# Patient Record
Sex: MALE | Race: BLACK OR AFRICAN AMERICAN | NOT HISPANIC OR LATINO | Employment: OTHER | URBAN - METROPOLITAN AREA
[De-identification: names, ages, dates, MRNs, and addresses within clinical notes are randomized per-mention and may not be internally consistent; named-entity substitution may affect disease eponyms.]

---

## 2018-06-25 ENCOUNTER — OFFICE VISIT (OUTPATIENT)
Dept: RHEUMATOLOGY | Facility: CLINIC | Age: 58
End: 2018-06-25
Payer: COMMERCIAL

## 2018-06-25 ENCOUNTER — HOSPITAL ENCOUNTER (OUTPATIENT)
Dept: RADIOLOGY | Facility: HOSPITAL | Age: 58
Discharge: HOME | End: 2018-06-25
Attending: INTERNAL MEDICINE
Payer: COMMERCIAL

## 2018-06-25 VITALS
BODY MASS INDEX: 26.25 KG/M2 | SYSTOLIC BLOOD PRESSURE: 159 MMHG | WEIGHT: 177.25 LBS | HEIGHT: 69 IN | HEART RATE: 73 BPM | DIASTOLIC BLOOD PRESSURE: 95 MMHG

## 2018-06-25 DIAGNOSIS — M25.511 CHRONIC PAIN OF BOTH SHOULDERS: Primary | ICD-10-CM

## 2018-06-25 DIAGNOSIS — G89.29 CHRONIC PAIN OF BOTH SHOULDERS: Primary | ICD-10-CM

## 2018-06-25 DIAGNOSIS — M54.41 CHRONIC MIDLINE LOW BACK PAIN WITH RIGHT-SIDED SCIATICA: ICD-10-CM

## 2018-06-25 DIAGNOSIS — G89.29 CHRONIC MIDLINE LOW BACK PAIN WITH RIGHT-SIDED SCIATICA: ICD-10-CM

## 2018-06-25 DIAGNOSIS — M25.511 CHRONIC PAIN OF BOTH SHOULDERS: ICD-10-CM

## 2018-06-25 DIAGNOSIS — G89.29 CHRONIC PAIN OF BOTH SHOULDERS: ICD-10-CM

## 2018-06-25 DIAGNOSIS — M25.512 CHRONIC PAIN OF BOTH SHOULDERS: ICD-10-CM

## 2018-06-25 DIAGNOSIS — R20.2 PARESTHESIA OF BOTH HANDS: ICD-10-CM

## 2018-06-25 DIAGNOSIS — M25.512 CHRONIC PAIN OF BOTH SHOULDERS: Primary | ICD-10-CM

## 2018-06-25 PROCEDURE — 73030 X-RAY EXAM OF SHOULDER: CPT | Mod: TC,50,FY,PO

## 2018-06-25 PROCEDURE — 72040 X-RAY EXAM NECK SPINE 2-3 VW: CPT | Mod: TC,FY,PO

## 2018-06-25 PROCEDURE — 99204 OFFICE O/P NEW MOD 45 MIN: CPT | Mod: S$GLB,,, | Performed by: INTERNAL MEDICINE

## 2018-06-25 PROCEDURE — 73030 X-RAY EXAM OF SHOULDER: CPT | Mod: 26,RT,, | Performed by: RADIOLOGY

## 2018-06-25 PROCEDURE — 72040 X-RAY EXAM NECK SPINE 2-3 VW: CPT | Mod: 26,,, | Performed by: RADIOLOGY

## 2018-06-25 PROCEDURE — 99999 PR PBB SHADOW E&M-NEW PATIENT-LVL III: CPT | Mod: PBBFAC,,, | Performed by: INTERNAL MEDICINE

## 2018-06-25 PROCEDURE — 3008F BODY MASS INDEX DOCD: CPT | Mod: CPTII,S$GLB,, | Performed by: INTERNAL MEDICINE

## 2018-06-25 PROCEDURE — 73030 X-RAY EXAM OF SHOULDER: CPT | Mod: 26,LT,, | Performed by: RADIOLOGY

## 2018-06-25 RX ORDER — LISINOPRIL AND HYDROCHLOROTHIAZIDE 10; 12.5 MG/1; MG/1
1 TABLET ORAL DAILY
COMMUNITY

## 2018-06-25 RX ORDER — NAPROXEN 500 MG/1
500 TABLET ORAL 2 TIMES DAILY
Qty: 60 TABLET | Refills: 0 | Status: SHIPPED | OUTPATIENT
Start: 2018-06-25 | End: 2018-08-14 | Stop reason: SDUPTHER

## 2018-06-25 RX ORDER — HYDROXYZINE HYDROCHLORIDE 10 MG/1
25 TABLET, FILM COATED ORAL 3 TIMES DAILY PRN
COMMUNITY

## 2018-06-25 RX ORDER — MULTIVITAMIN
1 TABLET ORAL DAILY
COMMUNITY

## 2018-06-25 NOTE — PROGRESS NOTES
CC:No chief complaint on file.      History of Present Illness:  Roberto Flores a 57 y.o.yo male presents for new patient evaluation.  He complains of pain in both shoulders, low back    Shoulder pain:  Chronic, right greater than left   Pain aggravated with range of motion.  It  hurts at rest as well  It radiates down the upper arms.   With intermittent  he notes tingling in both the hands.  It resolves spontaneously  No shoulder swelling noted.  No hand weakness  In 2016 he had a neck surgery in [place] .  Cervical disc surgery from what he says  He noted  the shoulder pain 6 months after surgery.  He followed up with the surgeon   No abnormalities noted and he was given Mobic  It helped for a while.   Now he takes Tylenol Extra Strength as needed with some relief.  He was on gabapentin in the past but stopped as it did not help much    Chronic low back pain:  He has noted worsening in the last 8-9 months.  Sometimes radiates down the right lower extremity.  No tingling numbness.  No incontinence noted  He had x-rays done by PCP and was told nothing significant  But the pain persisted and continued to worsen.    No joint swelling noted  He sometimes hurts in his knees.  This can be seen after periods  of prolonged resting or activity  He is a  not sure if she can do PT now    He is having a cough on lisinopril and stopped , due to f/u PCP   No rashes, fever, chills, sicca syndrome          Past Medical History:   Diagnosis Date    Hypertension 8/2016       Past Surgical History:   Procedure Laterality Date    CERVICAL DISC SURGERY           Social History   Substance Use Topics    Smoking status: Never Smoker    Smokeless tobacco: Not on file    Alcohol use Yes      Comment: occassionaly       History reviewed. No pertinent family history.     None for CTD     Review of patient's allergies indicates:  No Known Allergies          Review of Systems:  Constitutional: Denies fever, chills. No  recent weight changes.   Fatigue: no  Muscle weakness: no  Headaches: no new headaches  Eyes: No redness or dryness.  No recent visual changes.  ENT: Denies dry mouth. No oral or nasal ulcers.  Card: No chest pain.  Resp: No cough or sob.   Gastro: No nausea or vomiting.  No heartburn.  Constipation: no  Diarrhea: no  Genito:  No dysuria.  No genital ulcers.  Skin: No rash.  Raynauds:no  Neuro:+ hands intermittently   Psych: No depression, anxiety  Endo:  no excess thirst.  Heme: no abnormal bleeding or bruising  Clots:none   Miscarriages :     OBJECTIVE:     Vital Signs   Vitals:    06/25/18 0817   BP: (!) 159/95   Pulse: 73     Physical Exam:  General Appearance:  NAD.   Gait: not favoring.  HEENT: PERRL.  Eyes not dry or injected.  No nasal ulcers.  Mouth not dry, no oral lesions.  Lymph: cervical, supraclavicular or axillary nodes: none abnormal   Cardio: no irregularity of S1 or S2.  No gallops or rubs.   Resp: Normal respiratory motion. Clear to auscultation bilaterally.   No abnormal chest conformation.  Abd: Soft, non-tender, nondistended.  No masses.   Skin: Head and neck,  and extremities examined. No rashes.   Neuro: Ox3.   Cranial nerves II-XII grossly intact.   Sensation intact  in both distal LE and upper extremities to light touch.  Musculoskeletal Exam:    Right Side Rheumatological Exam     Examination finds the shoulder: pain on overhead abduction but normal ROM   elbow, wrist, , 1st PIP, 1st MCP, 2nd PIP, 2nd MCP, 3rd PIP, 3rd MCP, 4th PIP, 4th MCP, 5th PIP and 5th MCP no synovitis   Knee : crepitus   Others :  Hip: No synovitis   Ankle :No synovitis   Foot:No synovitis     Left Side Rheumatological Exam     Examination finds the shoulder: pain on overhead abduction but normal ROM    elbow, wrist, 1st PIP, 1st MCP, 2nd PIP, 2nd MCP, 3rd PIP, 3rd MCP, 4th PIP, 4th MCP, 5th PIP and 5th MCP no synovitis   Knee : crepitus   Others :  Hip:No synovitis   Ankle :No synovitis   Foot:No synovitis     Spine  :  Occiput to wall :neg   Modified schobers :neg     Tender points:  Muscle strength:Equal and full in all mm groups of the upper and lower ext.    Laboratory: No results found for this or any previous visit.  Imaging :    Notes reviewed  Other procedures:    ASSESSMENT/PLAN:     Chronic pain of both shoulders  -     Rheumatoid factor; Future; Expected date: 06/25/2018  -     Cyclic citrul peptide antibody, IgG; Future; Expected date: 06/25/2018  -     IMELDA; Standing  -     CK; Standing  -     Aldolase; Standing  -     Sedimentation rate; Standing  -     C-reactive protein; Standing  -     Comprehensive metabolic panel; Standing  -     CBC auto differential; Standing  -     X-Ray Cervical Spine AP And Lateral; Future; Expected date: 06/25/2018  -     X-Ray Shoulder 2 or more views Bilat; Future; Expected date: 06/25/2018  -     naproxen (NAPROSYN) 500 MG tablet; Take 1 tablet (500 mg total) by mouth 2 (two) times daily. With food  Dispense: 60 tablet; Refill: 0    Chronic midline low back pain with right-sided sciatica  -     MRI Lumbar Spine Without Contrast; Future; Expected date: 06/25/2018    Paresthesia of both hands  -     EMG W/ ULTRASOUND AND NERVE CONDUCTION TEST 2 Extremities; Future      1:b/l shoulder pain :  Possible arthritis plus rotator cuff tendinopathy  Check x-ray shoulder  Naprosyn 500 b.i.d.  PT/shoulder injections would be future considerations    2: b/l knee OA :  X ray knees   Naprosyn 500 b.i.d.    3:  Polyarthralgia involving shoulders, knees  Etiology seems mostly degenerative arthritis  Check labs as above to rule out any connective tissue    4:  Chronic low back pain:  Persistent, normal x-ray L-spine per him  Will check MRI L-spine    5:  History of cervical disc surgery:  Stable, check x-ray C-spine    6:  Tingling in both hands:  A possible neuropathy, check EMG/NCS      Risks vs Benefits and potential side effects of medication prescribed today were discussed with patient. Medication  literature given to patient up discharge  Went over uptodate information /literature on the meds prescribed today      4 week return for review

## 2018-06-25 NOTE — PROGRESS NOTES
CC:No chief complaint on file.      History of Present Illness:  Roberto Flores a 57 y.o.yo male   There is no problem list on file for this patient.        Past Medical History:   Diagnosis Date    Hypertension        Past Surgical History:   Procedure Laterality Date    CERVICAL DISC SURGERY           Social History   Substance Use Topics    Smoking status: Never Smoker    Smokeless tobacco: Not on file    Alcohol use Yes      Comment: occassionaly       History reviewed. No pertinent family history.    Review of patient's allergies indicates:  No Known Allergies          Review of Systems:  Constitutional: Denies fever, chills. No recent weight changes.   Fatigue: no  Muscle weakness: no  Headaches: no new headaches  Eyes: No redness or dryness.  No recent visual changes.  ENT: Denies dry mouth. No oral or nasal ulcers.  Card: No chest pain.  Resp: No cough or sob.   Gastro: No nausea or vomiting.  No heartburn.  Constipation: no  Diarrhea: no  Genito:  No dysuria.  No genital ulcers.  Skin: No rash.  Raynauds:no  Neuro: No numbness / tingling.   Psych: No depression, anxiety  Endo:  no excess thirst.  Heme: no abnormal bleeding or bruising  Clots:none   Miscarriages : none     OBJECTIVE:     Vital Signs   Vitals:    06/25/18 0817   BP: (!) 159/95   Pulse: 73     Physical Exam:  General Appearance:  NAD.   Gait: not favoring.  HEENT: PERRL.  Eyes not dry or injected.  No nasal ulcers.  Mouth not dry, no oral lesions.  Lymph: cervical, supraclavicular or axillary nodes: none abnormal   Cardio: no irregularity of S1 or S2.  No gallops or rubs.   Resp: Normal respiratory motion. Clear to auscultation bilaterally.   No abnormal chest conformation.  Abd: Soft, non-tender, nondistended.  No masses.   Skin: Head and neck,  and extremities examined. No rashes.   Neuro: Ox3.   Cranial nerves II-XII grossly intact.   Sensation intact  in both distal LE and upper extremities to light touch.  Musculoskeletal  Exam:    Right Side Rheumatological Exam     Examination finds the shoulder, elbow, wrist, knee, 1st PIP, 1st MCP, 2nd PIP, 2nd MCP, 3rd PIP, 3rd MCP, 4th PIP, 4th MCP, 5th PIP and 5th MCP no synovitis     Others :  Hip: No synovitis   Ankle :No synovitis   Foot:No synovitis     Left Side Rheumatological Exam     Examination finds the shoulder, elbow, wrist, knee, 1st PIP, 1st MCP, 2nd PIP, 2nd MCP, 3rd PIP, 3rd MCP, 4th PIP, 4th MCP, 5th PIP and 5th MCP no synovitis     Others :  Hip:No synovitis   Ankle :No synovitis   Foot:No synovitis     Spine :  Occiput to wall :  Modified schobers :    Tender points:  Muscle strength:Equal and full in all mm groups of the upper and lower ext.    Laboratory: No results found for this or any previous visit.  Imaging :    Notes reviewed  Other procedures:    ASSESSMENT/PLAN:     There are no diagnoses linked to this encounter.      Risks vs Benefits and potential side effects of medication prescribed today were discussed with patient. Medication literature given to patient up discharge  Went over uptodate information /literature on the meds prescribed today

## 2018-06-26 ENCOUNTER — TELEPHONE (OUTPATIENT)
Dept: RHEUMATOLOGY | Facility: CLINIC | Age: 58
End: 2018-06-26

## 2018-06-26 NOTE — TELEPHONE ENCOUNTER
----- Message from Zayra Goldstein sent at 6/26/2018 10:25 AM CDT -----  Contact: jaxon/gracia [phone]  States that she is returning call. Please call back at [phone]//thank you acc

## 2018-08-09 ENCOUNTER — TELEPHONE (OUTPATIENT)
Dept: RADIOLOGY | Facility: HOSPITAL | Age: 58
End: 2018-08-09

## 2018-08-10 ENCOUNTER — HOSPITAL ENCOUNTER (OUTPATIENT)
Dept: RADIOLOGY | Facility: HOSPITAL | Age: 58
Discharge: HOME | End: 2018-08-10
Attending: INTERNAL MEDICINE
Payer: COMMERCIAL

## 2018-08-10 DIAGNOSIS — G89.29 CHRONIC MIDLINE LOW BACK PAIN WITH RIGHT-SIDED SCIATICA: ICD-10-CM

## 2018-08-10 DIAGNOSIS — G89.29 CHRONIC PAIN OF BOTH SHOULDERS: ICD-10-CM

## 2018-08-10 DIAGNOSIS — M25.512 CHRONIC PAIN OF BOTH SHOULDERS: ICD-10-CM

## 2018-08-10 DIAGNOSIS — M25.511 CHRONIC PAIN OF BOTH SHOULDERS: ICD-10-CM

## 2018-08-10 DIAGNOSIS — M54.41 CHRONIC MIDLINE LOW BACK PAIN WITH RIGHT-SIDED SCIATICA: ICD-10-CM

## 2018-08-10 PROCEDURE — 72148 MRI LUMBAR SPINE W/O DYE: CPT | Mod: 26,,, | Performed by: RADIOLOGY

## 2018-08-10 PROCEDURE — 72148 MRI LUMBAR SPINE W/O DYE: CPT | Mod: TC,PO

## 2018-08-10 RX ORDER — NAPROXEN 500 MG/1
500 TABLET ORAL 2 TIMES DAILY
Qty: 60 TABLET | Refills: 0 | Status: CANCELLED | OUTPATIENT
Start: 2018-08-10

## 2018-08-10 NOTE — TELEPHONE ENCOUNTER
Spoke with Mrs. Sherman who states that Mr. Sherman needed a refill of his naproxen sent to [facility].Patient informed that Dr. Dominguez is out of the clinic and will not return until Monday August 13, 2018.

## 2018-08-10 NOTE — TELEPHONE ENCOUNTER
----- Message from Jagruti Braga sent at 8/10/2018  9:41 AM CDT -----  Contact: self [phone]  Would like to follow-up with nurse regarding status of refill request. Please call back at [phone].  Md Angi

## 2018-08-14 DIAGNOSIS — M25.512 CHRONIC PAIN OF BOTH SHOULDERS: ICD-10-CM

## 2018-08-14 DIAGNOSIS — M25.511 CHRONIC PAIN OF BOTH SHOULDERS: ICD-10-CM

## 2018-08-14 DIAGNOSIS — M48.061 SPINAL STENOSIS OF LUMBAR REGION, UNSPECIFIED WHETHER NEUROGENIC CLAUDICATION PRESENT: Primary | ICD-10-CM

## 2018-08-14 DIAGNOSIS — G89.29 CHRONIC PAIN OF BOTH SHOULDERS: ICD-10-CM

## 2018-08-14 NOTE — TELEPHONE ENCOUNTER
----- Message from Kalpesh Hancock sent at 8/14/2018  1:02 PM CDT -----  Be justinejosh ( Pt wife )is requesting a call from nurse to f/u on a prescription .          Please call Juan Diego fletcherjosh ( Pt wife )  [phone] ( cell) [phone] ext ( [phone] please ask for Lorie Sherman         .  [id number] - [place] [address] [address] [place] [address]  Phone: [phone] Fax: [phone]

## 2018-08-14 NOTE — TELEPHONE ENCOUNTER
----- Message from Lenka De La Cruz sent at 8/14/2018  3:03 PM CDT -----  Contact: Doris [phone] ext [phone]  Patient is calling in regards to his Naprox refills that was to be called in yesterday.

## 2018-08-15 ENCOUNTER — TELEPHONE (OUTPATIENT)
Dept: PHYSICAL MEDICINE AND REHAB | Facility: CLINIC | Age: 58
End: 2018-08-15

## 2018-08-15 ENCOUNTER — TELEPHONE (OUTPATIENT)
Dept: RHEUMATOLOGY | Facility: CLINIC | Age: 58
End: 2018-08-15

## 2018-08-15 RX ORDER — NAPROXEN 500 MG/1
500 TABLET ORAL 2 TIMES DAILY
Qty: 60 TABLET | Refills: 0 | Status: SHIPPED | OUTPATIENT
Start: 2018-08-15

## 2018-08-15 NOTE — TELEPHONE ENCOUNTER
Left a message for pt explaining that if we reschedule that appt , it will be in middle of September. Pt will call back to reschedule if needed, as of now appt is scheduled as was.

## 2018-08-15 NOTE — TELEPHONE ENCOUNTER
----- Message from Sharita Padilla sent at 8/15/2018 11:34 AM CDT -----  Contact: Patient   Patient would like a call back at [phone], Regards to rescheduling his appointment.    Thanks  Td

## 2018-08-15 NOTE — TELEPHONE ENCOUNTER
----- Message from Minerva Dominguez MD sent at 8/14/2018 10:57 PM CDT -----  Mri shows spinal canal narrowing , [facility] or [facility] in  [place]   Pt preference    referrel placed

## 2018-09-12 ENCOUNTER — TELEPHONE (OUTPATIENT)
Dept: PHYSICAL MEDICINE AND REHAB | Facility: CLINIC | Age: 58
End: 2018-09-12

## 2018-09-12 NOTE — TELEPHONE ENCOUNTER
----- Message from Emily King sent at 9/12/2018 10:09 AM CDT -----  Contact: Krystyna - [phone]  Would like to  Reschedule appt.  Please call back at [phone].  x-

## 2018-10-19 ENCOUNTER — OFFICE VISIT (OUTPATIENT)
Dept: PHYSICAL MEDICINE AND REHAB | Facility: CLINIC | Age: 58
End: 2018-10-19
Payer: COMMERCIAL

## 2018-10-19 VITALS
SYSTOLIC BLOOD PRESSURE: 164 MMHG | WEIGHT: 177 LBS | HEIGHT: 69 IN | DIASTOLIC BLOOD PRESSURE: 96 MMHG | HEART RATE: 68 BPM | RESPIRATION RATE: 12 BRPM | BODY MASS INDEX: 26.22 KG/M2

## 2018-10-19 DIAGNOSIS — M54.12 CHRONIC CERVICAL RADICULOPATHY: Primary | ICD-10-CM

## 2018-10-19 DIAGNOSIS — M54.12 ACUTE CERVICAL RADICULOPATHY: ICD-10-CM

## 2018-10-19 PROCEDURE — 99999 PR PBB SHADOW E&M-EST. PATIENT-LVL III: CPT | Mod: PBBFAC,,, | Performed by: PHYSICAL MEDICINE & REHABILITATION

## 2018-10-19 PROCEDURE — 3008F BODY MASS INDEX DOCD: CPT | Mod: CPTII,S$GLB,, | Performed by: PHYSICAL MEDICINE & REHABILITATION

## 2018-10-19 PROCEDURE — 95886 MUSC TEST DONE W/N TEST COMP: CPT | Mod: S$GLB,,, | Performed by: PHYSICAL MEDICINE & REHABILITATION

## 2018-10-19 PROCEDURE — 95911 NRV CNDJ TEST 9-10 STUDIES: CPT | Mod: S$GLB,,, | Performed by: PHYSICAL MEDICINE & REHABILITATION

## 2018-10-19 PROCEDURE — 99204 OFFICE O/P NEW MOD 45 MIN: CPT | Mod: 25,S$GLB,, | Performed by: PHYSICAL MEDICINE & REHABILITATION

## 2018-10-19 NOTE — PROGRESS NOTES
[facility] [address] [place] [address]  Phone: [phone]          Full Name: tristan correa YOB: 1960  Patient ID: [id number]      Visit Date: 10/19/2018 08:02  Age: 58 Years 2 Months Old  Examining Physician: Blanka Govea M.D.  Referring Physician:   Reason for Referral: ue numbness        Chief Complaint   Patient presents with    Numbness     hands/arms       HPI: This is a 58 y.o.  male being seen in clinic today for evaluation of chronic constant arm/hand numbness/tingling and achy pain at his shoulders radiating into his arms.  He has a history of cervical and lumbar spinal stenosis with subsequent cervical ACDF.  He still has additional leg weakness/stiffness with impaired gait.  He denies significant arm weakness, but has loss of  strength at times.  Nothing really provides significant relief.     History obtained from patient    Past family, medical, social, and surgical history reviewed in chart    Review of Systems:     General- denies lethargy, weight change, fever, chills  Head/neck- denies swallowing difficulties  ENT- denies hearing changes  Cardiovascular-denies chest pain  Pulmonary- denies shortness of breath  GI- denies constipation or bowel incontinence  - denies bladder incontinence  Skin- denies wounds or rashes  Musculoskeletal- +weakness, +pain  Neurologic- +numbness and tingling  Psychiatric- denies depressive or psychotic features, denies anxiety  Lymphatic-denies swelling  Endocrine- denies hypoglycemic symptoms/DM history  All other pertinent systems negative     Physical Examination:  General: Well developed, well nourished male, NAD  HEENT:NCAT EOMI bilaterally   Pulmonary:Normal respirations    Spinal Examination: CERVICAL  Active ROM is limited at endranges.  Inspection: No deformity of spinal alignment.  Palpation: No vertebral tenderness to percussion.    Ant surgical scar    Spinal Examination: LUMBAR or  THORACIC  Active ROM is limited at endranges  Inspection: No deformity of spinal alignment.      Musculoskeletal Tests:  Phalen: neg  Elbow compression (ulnar): neg  Tinels at wrist: neg    Bilateral Upper and Lower Extremities:  Pulses are 2+ at radial bilaterally.  Shoulder/Elbow/Wrist/Hand ROM wnl  Hip/Knee/Ankle ROM   Bilateral Extremities show normal capillary refill.  No signs of cyanosis, rubor, edema, skin changes, or dysvascular changes of appendages.  Nails appear intact.    Neurological Exam:  Cranial Nerves:  II-XII grossly intact    Manual Muscle Testing: (Motor 5=normal)  5/5 strength bilateral upper extremities    No focal atrophy is noted of either upper  extremity.    Bilateral Reflexes hypo at bic tric br  Bowser's response is absent bilaterally.    Sensation: tested to light touch  - intact in arms     Gait: limited hip and knee ROM, stiff gait-worse on right      Entire procedure explained to patient prior to proceeding.  Verbal consent obtained        SNC      Nerve / Sites Rec. Site Onset Lat Peak Lat Amp Segments Distance Velocity     ms ms µV  mm m/s   R Median - Digit II (Antidromic)      Wrist Dig II 3.6 5.1 23.9 Wrist - Dig  38   L Median - Digit II (Antidromic)      Wrist Dig II 3.6 5.5 36.5 Wrist - Dig  39   R Ulnar - Digit V (Antidromic)      Wrist Dig V 3.7 4.9 9.7 Wrist - Dig V 140 38   L Ulnar - Digit V (Antidromic)      Wrist Dig V 3.4 4.6 12.5 Wrist - Dig V 140 41   R Radial - Anatomical snuff box (Forearm)      Forearm Wrist 2.0 2.8 10.9 Forearm - Wrist 100 49   L Radial - Anatomical snuff box (Forearm)      Forearm Wrist 2.2 2.8 16.9 Forearm - Wrist 100 46       MNC      Nerve / Sites Muscle Latency Amplitude Duration Rel Amp Segments Distance Lat Diff Velocity     ms mV ms %  mm ms m/s   R Median - APB      Wrist APB 5.0 13.2 7.3 100 Wrist - APB 80        Elbow APB 9.8 12.5 7.7 95 Elbow - Wrist 220 4.8 46   L Median - APB      Wrist APB 5.3 10.5 8.0 100 Wrist - APB  80        Elbow APB 10.8 8.2 8.1 77.9 Elbow - Wrist 260 5.6 47   R Ulnar - ADM      Wrist ADM 3.8 7.4 7.7 100 Wrist - ADM 80        B.Elbow ADM 8.4 6.2 7.7 83.5 B.Elbow - Wrist 250 4.6 54      A.Elbow ADM 10.3 6.7 8.8 109 A.Elbow - B.Elbow 100 1.9 52         A.Elbow - Wrist  6.6    L Ulnar - ADM      Wrist ADM 3.8 11.4 6.9 100 Wrist - ADM 80        B.Elbow ADM 9.0 9.1 6.8 80 B.Elbow - Wrist 250 5.2 48      A.Elbow ADM 11.1 8.4 7.7 92.5 A.Elbow - B.Elbow 100 2.1 48         A.Elbow - Wrist  7.3        EMG            EMG Summary Table     Spontaneous MUAP Recruitment   Muscle IA Fib PSW Fasc Other Amp Dur Polys Pattern Effort   R. First dorsal interosseous N None None None . N n 1+ sl red Max   R. Abductor pollicis brevis N None None None . N n 2+ Reduced Max   R. Flexor carpi ulnaris N None None None . N n N sl red Max   R. Pronator teres Incr 1+ None None . N n 1+ sl red Max   R. Triceps brachii Incr None 1+ None . N Sl Incr 1+ sl red Max   R. Biceps brachii N None None None . N n 1+ sl red Max   R. Deltoid N None None None . N n 1+ Reduced Max                                  INTERPRETATION  -Bilateral median motor nerve conduction study showed normal latency, amplitude, and conduction velocity  -Bilateral median sensory nerve conduction study showed normal peak latency and amplitude  -Bilateral ulnar motor nerve conduction study showed normal latency, amplitude, and conduction velocity  -Bilateral ulnar sensory nerve conduction study showed normal peak latency and amplitude  -Bilateral radial sensory nerve conduction study showed normal peak latency and amplitude  -Needle EMG examination performed to above mentioned muscles     IMPRESSION  1. ABNORMAL study  2. There is electrodiagnostic evidence of an ACUTE on CHRONIC radiculopathy of the C7 nerve root and a CHRONIC radiculopathy of the C5-T1 nerve roots    PLAN  1. Follow up with referring provider: Dr. Minerva Dominguez  2. Handouts on Cspine issues,  radiculopathy, stretching provided. Rec referral to PT for strengthening, gait, traction, ROM. Rec referral to IPM for further evaluation and treatment (possible ESIs)  3. This study is good for one year. If symptoms worsen or do not improve, please re-consult.    Blanka Govea M.D.  Physical Medicine and Rehab

## 2018-10-19 NOTE — LETTER
October 19, 2018      Minerva Dominguez MD  [address] [place] [address] [address] [place] [address]           [facility]iat[facility]  [address] [place] [address]  Phone: [phone]  Fax: [phone]          Patient: Roberto Sherman   MR Number: [id number]   YOB: 1960   Date of Visit: 10/19/2018       Dear Dr. Minerva Dominguez:    Thank you for referring Roberto Sherman to me for evaluation. Attached you will find relevant portions of my assessment and plan of care.    If you have questions, please do not hesitate to call me. I look forward to following Roberto Sherman along with you.    Sincerely,    Blanka Govea MD    Enclosure  CC:  No Recipients    If you would like to receive this communication electronically, please contact [email or web address] or [phone] to request more information on [employer] Link access.    For providers and/or their staff who would like to refer a patient to [facility], please contact us through our one-stop-shop provider referral line, [facility], at [phone].    If you feel you have received this communication in error or would no longer like to receive these types of communications, please e-mail [email or web address]

## 2018-10-19 NOTE — PATIENT INSTRUCTIONS
Understanding Cervical Radiculopathy    Cervical radiculopathy is irritation or inflammation of a nerve root in the neck. It causes neck pain and other symptoms that may spread into the chest or down the arm. To understand this condition, it helps to understand the parts of the spine:  · Vertebrae. These are bones that stack to form the spine. The cervical spine contains the 7 vertebrae in the neck.  · Disks. These are soft pads of tissue between the vertebrae. They act as shock absorbers for the spine.  · The spinal canal. This is a tunnel formed within the stacked vertebrae. The spinal cord runs through this canal.  · Nerves. These branch off the spinal cord. As they leave the spinal canal, nerves pass through openings between the vertebrae. The nerve root is the part of the nerve that is closest to the spinal cord.   With cervical radiculopathy, nerve roots in the neck become irritated. This leads to pain and symptoms that can travel to the nerves that go from the spinal cord down the arms and into the torso.  What causes cervical radiculopathy?  Aging, injury, poor posture, and other issues can lead to problems in the neck. These problems may then irritate nerve roots. These include:  · Damage to a disk in the cervical spine. The damaged disk may then press on nearby nerve roots.  · Degeneration from wear and tear, and aging. This can lead to narrowing (stenosis) of the openings between the vertebrae. The narrowed openings press on nerve roots as they leave the spinal canal.  · An unstable spine. This is when a vertebra slips forward. It can then press on a nerve root.  There are other, less common causes of pressure on nerves in the neck. These include infection, cysts, and tumors.  Symptoms of cervical radiculopathy  These include:  · Neck pain  · Pain, numbness, tingling, or weakness that travels down the arm  · Loss of neck movement  · Muscle spasms  Treatment for cervical radiculopathy  In most cases,  your healthcare provider will first try treatments that help relieve symptoms. These may include:  · Prescription or over-the-counter pain medicines. These help relieve pain and swelling.  · Cold packs. These help reduce pain.  · Resting. This involves avoiding positions and activities that increase pain.  · Neck brace (cervical collar). This can help relieve inflammation and pain.  · Physical therapy, including exercises and stretches. This can help decrease pain and increase movement and function.  · Shots of medicinesaround the nerve roots. This is done to help relieve symptoms for a time.  In some cases, your healthcare provider may advise surgery to fix the underlying problem. This depends on the cause, the symptoms, and how long the pain has lasted.  Possible complications  Over time, an irritated and inflamed nerve may become damaged. This may lead to long-lasting (permanent) numbness or weakness. If symptoms change suddenly or get worse, be sure to let your healthcare provider know.     When to call your healthcare provider  Call your healthcare provider right away if you have any of these:  · New pain or pain that gets worse  · New or increasing weakness, numbness, or tingling in your arm or hand  · Bowel or bladder changes   Date Last Reviewed: 3/10/2016  © 4893-8152 [employer]. [address] [place] [address], [place] [address]. All rights reserved. This information is not intended as a substitute for professional medical care. Always follow your healthcare professional's instructions.        Nonsurgical Treatment of Cervical Spine Problems  Cervical spine problems can often be treated without surgery. Choices include rest, medicines, or injections. Your healthcare provider may also suggest physical therapy or certain exercises. These may all help to relieve your symptoms. These treatments are often successful. But if your symptoms dont subside, talk to your healthcare provider. He or she may tell  you that surgery is your best choice.  Relieving your symptoms  Your healthcare provider may recommend:  · Medicines. These help to reduce pain and inflammation.  · Epidural steroid injections. These are injections into the spinal canal near the spinal cord. They may relieve severe pain and reduce inflammation.  · Restricting aggravating activities. This can help to give your cervical spine time to heal.  · A soft cervical collar. This can help to support your head while keeping your cervical spine aligned.  · Traction. This may help relieve pressure on the irritated nerves.  Restoring mobility and strength  Your healthcare provider may recommend that you work with a physical therapist. This can help you regain mobility and strength in your neck. Physical therapy may last for 4 to 8 weeks. It may include:  · Exercises to improve your necks range of motion and strength.  · Evaluation and correction of posture and body movements. This can correct problems that affect your cervical spine.  · Heat, massage, and traction to help relieve your symptoms.  Self-care  Youll take an active role in your own therapy. To protect your neck from further injury:  · Follow any exercise program given to you by your healthcare provider or physical therapist.  · Practice good posture while sitting, standing, or moving.  · Have your workspace evaluated. Rearrange it if needed.  · When lying down, support your neck. You can use a special cervical pillow or rolled-up towel.   Date Last Reviewed: 10/5/2015  © 7203-6996 [employer]. [address] [place] [address]. All rights reserved. This information is not intended as a substitute for professional medical care. Always follow your healthcare professional's instructions.        Understanding Neck Problems       If you suffer from neck pain, youre not alone. Many people have neck pain at some point in their lives. Problems such as poor posture, injury, and wear and  tear can lead to neck pain. Your healthcare provider will work with you to find the treatment thats best for your neck.  Types of neck problems    The following problems can cause pain or injury in your neck:  · Strains and sprains: Strains (stretched or torn muscles) and sprains (stretched or torn ligaments) can cause neck pain. Strains and sprains can occur during an accident, or when you overuse your neck through repetitive motion. They can also cause your muscles and ligaments to become inflamed (swollen and painful).  · Whiplash and other injuries: Whiplash can result when an impact throws your head, forcing your neck too far forward, then too far backward. When combined, the two motions can cause a painful injury to different parts of your neck, such as muscles, ligaments, or joints. The most common cause of whiplash is a car accident. But it can also happen during a fall or sports injury.  · Weakened disks: A simple action, such as a sneeze or a cough, can cause one of your disks to bulge or rupture (herniate). A herniated disk can put pressure on your nerve and cause pain. Over time, disks can also thin out (degenerate). Flattened disks dont cushion vertebrae well and can cause vertebrae to rub together. Also, there is less space for the nerves. This can pinch nerves and cause pain.  · Weakened joints: Aging and injury can cause joints to slowly degenerate. Thinned joints can also cause vertebrae to rub together. This can cause abnormal growths of bone (bone spurs) to form on vertebrae. Bone spurs put pressure on nerves, causing pain.  Common symptoms  If you have a neck problem, you may have one or more of the following symptoms:  · Muscle tension and spasm: You may not be able to move your neck, arms, or shoulders comfortably if you have muscle tension or stiffness in your neck. If your symptoms arent relieved, you may experience muscle spasms, or knots of contracted tissue (trigger points) in areas of  your neck and shoulders.  · Aches and pains: Dull aches in your head or neck, sharp pains, and swelling of the soft tissue of your neck and shoulders are common symptoms. If theres pressure on the nerves in your neck, you may feel pain in your arms or hands.  · Numbness or weakness: If you injure the nerves in your neck, you may have numbness, tingling, or weakness in your shoulders, arms, or hands. These symptoms arise when disks or bone spurs press on the nerves in your neck. Severe disease can also affect your legs.  Date Last Reviewed: 8/23/2015  © 2241-2016 [employer]. [address] [place] [address], [place] [address]. All rights reserved. This information is not intended as a substitute for professional medical care. Always follow your healthcare professional's instructions.        Neck Problems: Relieving Your Symptoms  The first goal of treatment is to relieve your symptoms. Your healthcare provider may recommend self-care treatments. These include resting, applying ice and heat, taking medicine, and doing exercises. Your healthcare provider may also recommend that you see a physical therapist who can teach you ways to care for and strengthen your neck.     Heat relaxes sore muscles and helps relieve spasms.   Self-care treatments  Pain can end quickly or last awhile. Either way, youll want relief as soon as possible. Your healthcare provider can tell you which treatments to do at home to help relieve your pain.  · Lying down for a short time takes pressure from the head off the neck.  · Ice and heat can help reduce pain. To bring down swelling, rest an ice pack wrapped in a thin towel on your neck for 10 to 15 minutes. To relax sore muscles, apply a warm, wet towel to the area. Or you can take a warm bath or shower.  · Over-the-counter medicines, such as ibuprofen, naproxen, and aspirin, can help reduce pain and swelling. Acetaminophen can help relieve pain. Use these only as  directed.  · Exercises can relax muscles and ease stiffness. To prepare, drape a warm, wet towel around your neck and shoulders for 5 minutes. Remove the towel. Then do any exercises recommended to you by your healthcare provider.  Physical therapy  If self-care treatments arent helping relieve neck pain, your healthcare provider may suggest physical therapy. Physical therapy is done by a specialist trained to treat injuries. Your physical therapist (PT) will teach you how to strengthen muscles, improve the spines alignment, and help you move properly. Treatment methods used in physical therapy may include:  · Heat. A special heating pad called a neck pack may be applied to your neck.  · Exercises. Your PT will teach you exercises to help strengthen your neck and improve its range of motion.  · Joint mobilization. The PT gently moves your vertebrae to help restore motion in your neck joints and reduce neck pain.  · Soft tissue mobilization. The PT massages and stretches the muscles in your neck and shoulders.  · Electrical stimulation. Electrical impulses are sent into your neck. This helps reduce soreness and inflammation.  · Education in body mechanics. The PT shows you ways to position and move your body that protect the neck.  Other treatments  If physical therapy doesnt relieve your neck pain, your healthcare provider may suggest other treatments. For example, medicines or injections can help relieve pain and swelling. In some cases, surgery may be needed to treat neck problems.  Date Last Reviewed: 8/23/2015  © 6779-3984 [employer] [address] [place] [address], [place] [address]. All rights reserved. This information is not intended as a substitute for professional medical care. Always follow your healthcare professional's instructions.

## 2018-10-22 ENCOUNTER — TELEPHONE (OUTPATIENT)
Dept: RHEUMATOLOGY | Facility: CLINIC | Age: 58
End: 2018-10-22

## 2018-10-22 NOTE — TELEPHONE ENCOUNTER
----- Message from Minerva Dominguez MD sent at 10/22/2018 10:04 AM CDT -----      ----- Message -----  From: Blanka Govea MD  Sent: 10/19/2018   8:38 AM  To: Minerva Dominguez MD

## 2018-11-09 ENCOUNTER — OFFICE VISIT (OUTPATIENT)
Dept: RHEUMATOLOGY | Facility: CLINIC | Age: 58
End: 2018-11-09
Payer: COMMERCIAL

## 2018-11-09 ENCOUNTER — TELEPHONE (OUTPATIENT)
Dept: PAIN MEDICINE | Facility: CLINIC | Age: 58
End: 2018-11-09

## 2018-11-09 ENCOUNTER — TELEPHONE (OUTPATIENT)
Dept: RHEUMATOLOGY | Facility: CLINIC | Age: 58
End: 2018-11-09

## 2018-11-09 ENCOUNTER — LAB VISIT (OUTPATIENT)
Dept: LAB | Facility: HOSPITAL | Age: 58
End: 2018-11-09
Attending: INTERNAL MEDICINE
Payer: COMMERCIAL

## 2018-11-09 VITALS
WEIGHT: 181.69 LBS | BODY MASS INDEX: 26.91 KG/M2 | DIASTOLIC BLOOD PRESSURE: 90 MMHG | HEART RATE: 71 BPM | SYSTOLIC BLOOD PRESSURE: 145 MMHG | HEIGHT: 69 IN

## 2018-11-09 DIAGNOSIS — R76.8 ANA POSITIVE: ICD-10-CM

## 2018-11-09 DIAGNOSIS — M48.061 SPINAL STENOSIS OF LUMBAR REGION, UNSPECIFIED WHETHER NEUROGENIC CLAUDICATION PRESENT: ICD-10-CM

## 2018-11-09 DIAGNOSIS — R76.8 ANA POSITIVE: Primary | ICD-10-CM

## 2018-11-09 DIAGNOSIS — R74.8 ELEVATED CK: ICD-10-CM

## 2018-11-09 DIAGNOSIS — R74.8 ELEVATED CK: Primary | ICD-10-CM

## 2018-11-09 LAB
ALBUMIN SERPL BCP-MCNC: 4.3 G/DL
ALP SERPL-CCNC: 197 U/L
ALT SERPL W/O P-5'-P-CCNC: 786 U/L
ANION GAP SERPL CALC-SCNC: 13 MMOL/L
AST SERPL-CCNC: 737 U/L
BASOPHILS # BLD AUTO: 0.01 K/UL
BASOPHILS NFR BLD: 0.2 %
BILIRUB SERPL-MCNC: 1.8 MG/DL
BUN SERPL-MCNC: 14 MG/DL
CALCIUM SERPL-MCNC: 10.1 MG/DL
CHLORIDE SERPL-SCNC: 106 MMOL/L
CK SERPL-CCNC: 421 U/L
CO2 SERPL-SCNC: 22 MMOL/L
CREAT SERPL-MCNC: 1 MG/DL
CRP SERPL-MCNC: 9.2 MG/L
DIFFERENTIAL METHOD: ABNORMAL
EOSINOPHIL # BLD AUTO: 0.1 K/UL
EOSINOPHIL NFR BLD: 1.5 %
ERYTHROCYTE [DISTWIDTH] IN BLOOD BY AUTOMATED COUNT: 14.7 %
ERYTHROCYTE [SEDIMENTATION RATE] IN BLOOD BY WESTERGREN METHOD: 3 MM/HR
EST. GFR  (AFRICAN AMERICAN): >60 ML/MIN/1.73 M^2
EST. GFR  (NON AFRICAN AMERICAN): >60 ML/MIN/1.73 M^2
GLUCOSE SERPL-MCNC: 91 MG/DL
HCT VFR BLD AUTO: 46.1 %
HGB BLD-MCNC: 15 G/DL
LYMPHOCYTES # BLD AUTO: 0.9 K/UL
LYMPHOCYTES NFR BLD: 17.2 %
MCH RBC QN AUTO: 28.6 PG
MCHC RBC AUTO-ENTMCNC: 32.5 G/DL
MCV RBC AUTO: 88 FL
MONOCYTES # BLD AUTO: 0.2 K/UL
MONOCYTES NFR BLD: 4.4 %
NEUTROPHILS # BLD AUTO: 4.2 K/UL
NEUTROPHILS NFR BLD: 76.7 %
PLATELET # BLD AUTO: 212 K/UL
PMV BLD AUTO: 10.8 FL
POTASSIUM SERPL-SCNC: 4.2 MMOL/L
PROT SERPL-MCNC: 8.1 G/DL
RBC # BLD AUTO: 5.25 M/UL
SODIUM SERPL-SCNC: 141 MMOL/L
WBC # BLD AUTO: 5.42 K/UL

## 2018-11-09 PROCEDURE — 85025 COMPLETE CBC W/AUTO DIFF WBC: CPT | Mod: PO

## 2018-11-09 PROCEDURE — 99214 OFFICE O/P EST MOD 30 MIN: CPT | Mod: S$GLB,,, | Performed by: INTERNAL MEDICINE

## 2018-11-09 PROCEDURE — 80053 COMPREHEN METABOLIC PANEL: CPT | Mod: PO

## 2018-11-09 PROCEDURE — 99999 PR PBB SHADOW E&M-EST. PATIENT-LVL III: CPT | Mod: PBBFAC,,, | Performed by: INTERNAL MEDICINE

## 2018-11-09 PROCEDURE — 85651 RBC SED RATE NONAUTOMATED: CPT | Mod: PO

## 2018-11-09 PROCEDURE — 86146 BETA-2 GLYCOPROTEIN ANTIBODY: CPT | Mod: 59

## 2018-11-09 PROCEDURE — 82550 ASSAY OF CK (CPK): CPT | Mod: PO

## 2018-11-09 PROCEDURE — 3008F BODY MASS INDEX DOCD: CPT | Mod: CPTII,S$GLB,, | Performed by: INTERNAL MEDICINE

## 2018-11-09 PROCEDURE — 82085 ASSAY OF ALDOLASE: CPT

## 2018-11-09 PROCEDURE — 86140 C-REACTIVE PROTEIN: CPT

## 2018-11-09 PROCEDURE — 85613 RUSSELL VIPER VENOM DILUTED: CPT

## 2018-11-09 PROCEDURE — 86225 DNA ANTIBODY NATIVE: CPT

## 2018-11-09 PROCEDURE — 86147 CARDIOLIPIN ANTIBODY EA IG: CPT

## 2018-11-09 PROCEDURE — 36415 COLL VENOUS BLD VENIPUNCTURE: CPT | Mod: PO

## 2018-11-09 PROCEDURE — 80074 ACUTE HEPATITIS PANEL: CPT

## 2018-11-09 RX ORDER — GABAPENTIN 100 MG/1
100 CAPSULE ORAL NIGHTLY
Qty: 90 CAPSULE | Refills: 5 | Status: SHIPPED | OUTPATIENT
Start: 2018-11-09 | End: 2020-03-06 | Stop reason: SDUPTHER

## 2018-11-09 NOTE — TELEPHONE ENCOUNTER
Called patient about labs.  He and his wife will call back to schedule labs. Advised patient of all information per Dr Dominguez.

## 2018-11-09 NOTE — TELEPHONE ENCOUNTER
Patient noted to have elevated LFTs  Patient called and notified of the results  He is not a chronic alcoholic he drinks occasionally  No recent travel, no diarrhea, no fever  Patient is asymptomatic, advised him to go to the nearest ED for further evaluation and possible ultrasound  He states he is on his way to [facility]  Patient said he would contact his primary care Dr Lopez and see what he can do for him    Told him if persistent elevation in LFTs noted he might have to see GI in future for further evaluation  He will keep me updated

## 2018-11-09 NOTE — TELEPHONE ENCOUNTER
Patient's wife Doris called back asking can he get the labs done at his Primary care doctor.  His doctor will call us for the information.

## 2018-11-09 NOTE — TELEPHONE ENCOUNTER
----- Message from Minerva Dominguez MD sent at 11/9/2018 11:51 AM CST -----  Patient noted to have elevated LFTs  Patient advised to go to ED, but he said he is on his way to [place] and would contact his PCP Dr Lopez  We do not have any information on his PCP, please call him for further info regarding PCP office and fax these results

## 2018-11-09 NOTE — TELEPHONE ENCOUNTER
----- Message from Ly Mercedes sent at 11/9/2018  2:38 PM CST -----  Contact: Pt Wife   Pt Wife is returning missed call. .[phone] (home)

## 2018-11-09 NOTE — PROGRESS NOTES
CC:  Chief Complaint   Patient presents with    Follow-up       History of Present Illness:  Roberto Flores a 58 y.o.yo male presents for follow-up and review of recent EMG results    He was initially seen in our clinic in June this year, for chronic pain in the neck low back shoulders knees  He is currently on Naprosyn 500 mg p.o. b.i.d. and this helps  He had EMG studies done for neuropathy involving both his upper arms and hands sparing forearm  This was suggestive of   ACUTE on CHRONIC radiculopathy of the C7 nerve root and a CHRONIC radiculopathy of the C5-T1 nerve roots     He states since last visit neuropathy in both his upper extremities are stable.  He is more concerned about low back pain and he has to drag  his right leg sometimes well walking  No loss of sensation  MRI was suggestive of spinal stenosis    We have discussed these results and need for him to see a neurosurgeon.  We have also discussed the possibility of restarting him on gabapentin    No other concerns    Last visit he had an abnormal labs with a positive IMELDA 1 in 640 and mild elevation in CK  He however denies any rash, joint swelling, history of uveitis.  He denies any chest pain or shortness of breath.  Denies any fever chills, weight loss    No family history of any connective tissue disease  Denies any history of hepatitis he is aware of      History:    Shoulder pain:  Chronic, right greater than left   Pain aggravated with range of motion.  It  hurts at rest as well  It radiates down the upper arms.   With intermittent  he notes tingling in both the hands.  It resolves spontaneously  No shoulder swelling noted.  No hand weakness  In 2016 he had a neck surgery in [place] .  Cervical disc surgery from what he says  He noted  the shoulder pain 6 months after surgery.  He followed up with the surgeon   No abnormalities noted and he was given Mobic  It helped for a while.   Now he takes Tylenol Extra Strength as needed with some  relief.  He was on gabapentin in the past but stopped as it did not help much    Chronic low back pain:  He has noted worsening in the last 8-9 months.  Sometimes radiates down the right lower extremity.  No tingling numbness.  No incontinence noted  He had x-rays done by PCP and was told nothing significant  But the pain persisted and continued to worsen.    No joint swelling noted  He sometimes hurts in his knees.  This can be seen after periods  of prolonged resting or activity  He is a  not sure if she can do PT now    He is having a cough on lisinopril and stopped , due to f/u PCP   No rashes, fever, chills, sicca syndrome          Past Medical History:   Diagnosis Date    Hypertension 8/2016       Past Surgical History:   Procedure Laterality Date    CERVICAL DISC SURGERY           Social History     Tobacco Use    Smoking status: Never Smoker   Substance Use Topics    Alcohol use: Yes     Comment: occassionaly    Drug use: No       No family history on file.     None for CTD     Review of patient's allergies indicates:  No Known Allergies          Review of Systems:  Constitutional: Denies fever, chills. No recent weight changes.   Fatigue: no  Muscle weakness: no  Headaches: no new headaches  Eyes: No redness or dryness.  No recent visual changes.  ENT: Denies dry mouth. No oral or nasal ulcers.  Card: No chest pain.  Resp: No cough or sob.   Gastro: No nausea or vomiting.  No heartburn.  Constipation: no  Diarrhea: no  Genito:  No dysuria.  No genital ulcers.  Skin: No rash.  Raynauds:no  Neuro:+ hands intermittently   Psych: No depression, anxiety  Endo:  no excess thirst.  Heme: no abnormal bleeding or bruising  Clots:none   Miscarriages :     OBJECTIVE:     Vital Signs   Vitals:    11/09/18 0901   BP: (!) 145/90   Pulse: 71     Physical Exam:  General Appearance:  NAD.   Gait: not favoring.  HEENT: PERRL.  Eyes not dry or injected.  No nasal ulcers.  Mouth not dry, no oral  lesions.  Lymph: cervical, supraclavicular or axillary nodes: none abnormal   Cardio: no irregularity of S1 or S2.  No gallops or rubs.   Resp: Normal respiratory motion. Clear to auscultation bilaterally.   No abnormal chest conformation.  Abd: Soft, non-tender, nondistended.  No masses.   Skin: Head and neck,  and extremities examined. No rashes.   Neuro: Ox3.   Cranial nerves II-XII grossly intact.   Sensation intact  in both distal LE and upper extremities to light touch.  Musculoskeletal Exam:    Right Side Rheumatological Exam     Examination finds the shoulder: pain on overhead abduction but normal ROM   elbow, wrist, , 1st PIP, 1st MCP, 2nd PIP, 2nd MCP, 3rd PIP, 3rd MCP, 4th PIP, 4th MCP, 5th PIP and 5th MCP no synovitis   Knee : crepitus   Others :  Hip: No synovitis   Ankle :No synovitis   Foot:No synovitis     Left Side Rheumatological Exam     Examination finds the shoulder: pain on overhead abduction but normal ROM    elbow, wrist, 1st PIP, 1st MCP, 2nd PIP, 2nd MCP, 3rd PIP, 3rd MCP, 4th PIP, 4th MCP, 5th PIP and 5th MCP no synovitis   Knee : crepitus   Others :  Hip:No synovitis   Ankle :No synovitis   Foot:No synovitis     Spine :  Tenderness in lower lumbar facet region  Occiput to wall :neg   Modified schobers :neg       Muscle strength:Equal and full in all mm groups of the upper and lower ext.    Laboratory:   Results for orders placed or performed in visit on 06/25/18   Rheumatoid factor   Result Value Ref Range    Rheumatoid Factor <10.0 0.0 - 15.0 IU/mL   Cyclic citrul peptide antibody, IgG   Result Value Ref Range    CCP Antibodies <0.5 <5.0 U/mL   IMELDA   Result Value Ref Range    IMELDA Screen Positive (A) Negative <1:160   CK   Result Value Ref Range     (H) 20 - 200 U/L   Aldolase   Result Value Ref Range    Aldolase 3.6 1.2 - 7.6 U/L   Sedimentation rate   Result Value Ref Range    Sed Rate 8 0 - 10 mm/Hr   C-reactive protein   Result Value Ref Range    CRP 7.1 0.0 - 8.2 mg/L    Comprehensive metabolic panel   Result Value Ref Range    Sodium 143 136 - 145 mmol/L    Potassium 4.2 3.5 - 5.1 mmol/L    Chloride 105 95 - 110 mmol/L    CO2 31 (H) 23 - 29 mmol/L    Glucose 94 70 - 110 mg/dL    BUN, Bld 16 6 - 20 mg/dL    Creatinine 1.0 0.5 - 1.4 mg/dL    Calcium 10.1 8.7 - 10.5 mg/dL    Total Protein 8.0 6.0 - 8.4 g/dL    Albumin 4.4 3.5 - 5.2 g/dL    Total Bilirubin 0.8 0.1 - 1.0 mg/dL    Alkaline Phosphatase 57 55 - 135 U/L    AST 18 10 - 40 U/L    ALT 14 10 - 44 U/L    Anion Gap 7 (L) 8 - 16 mmol/L    eGFR if African American >60 >60 mL/min/1.73 m^2    eGFR if non African American >60 >60 mL/min/1.73 m^2   CBC auto differential   Result Value Ref Range    WBC 4.92 3.90 - 12.70 K/uL    RBC 5.04 4.60 - 6.20 M/uL    Hemoglobin 14.4 14.0 - 18.0 g/dL    Hematocrit 44.6 40.0 - 54.0 %    MCV 89 82 - 98 fL    MCH 28.6 27.0 - 31.0 pg    MCHC 32.3 32.0 - 36.0 g/dL    RDW 15.0 (H) 11.5 - 14.5 %    Platelets 214 150 - 350 K/uL    MPV 10.7 9.2 - 12.9 fL    Gran # (ANC) 3.0 1.8 - 7.7 K/uL    Lymph # 1.5 1.0 - 4.8 K/uL    Mono # 0.4 0.3 - 1.0 K/uL    Eos # 0.0 0.0 - 0.5 K/uL    Baso # 0.01 0.00 - 0.20 K/uL    Gran% 60.6 38.0 - 73.0 %    Lymph% 30.9 18.0 - 48.0 %    Mono% 7.5 4.0 - 15.0 %    Eosinophil% 0.8 0.0 - 8.0 %    Basophil% 0.2 0.0 - 1.9 %    Differential Method Automated    IMELDA Titer   Result Value Ref Range    IMELDA HEP-2 Titer Positive 1:640 Homogeneous     IMELDA Profile   Result Value Ref Range    Anti Sm Antibody 0.62 0.00 - 19.99 EU    Anti-Sm Interpretation Negative Negative    Anti-SSA Antibody 1.29 0.00 - 19.99 EU    Anti-SSA Interpretation Negative Negative    Anti-SSB Antibody 0.27 0.00 - 19.99 EU    Anti-SSB Interpretation Negative Negative    ds DNA Ab Negative 1:10 Negative 1:10    Anti Sm/RNP Antibody 2.08 0.00 - 19.99 EU    Anti-Sm/RNP Interpretation Negative Negative     Imaging :L4-L5 asymmetric mild right neural foraminal encroachment and spinal canal narrowing secondary to disc  protrusion and facet and ligamentum flavum hypertrophy.  There is also mild spinal canal narrowing at L3-L4 as discussed above.    Notes reviewed  Other procedures:    ASSESSMENT/PLAN:     IMELDA positive  -     Cancel: Ambulatory consult to Neurosurgery  -     Comprehensive metabolic panel; Standing  -     C-reactive protein; Standing  -     Sedimentation rate; Standing  -     CBC auto differential; Standing  -     Anti-DNA antibody, double-stranded; Standing; Expected date: 11/09/2018  -     Urinalysis; Standing  -     Hepatitis panel, acute; Future  -     DRVVT; Future; Expected date: 11/09/2018  -     Cardiolipin antibody; Future; Expected date: 11/09/2018  -     Beta-2 glycoprotein antibodies; Future; Expected date: 11/09/2018  -     CK; Standing  -     Aldolase; Standing  -     Protein / creatinine ratio, urine; Standing    Spinal stenosis of lumbar region, unspecified whether neurogenic claudication present  -     Ambulatory Referral to Neurosurgery  -     gabapentin (NEURONTIN) 100 MG capsule; Take 1 capsule (100 mg total) by mouth every evening.  Dispense: 90 capsule; Refill: 5    Elevated CK      1:  Neuropathy both upper extremities:  EMG studies reviewed  Restart gabapentin 100 mg q.h.s. in a week if well-tolerated increase to 200 mg q.h.s. then 300 mg q.h.s.  Referred to Neurosurgery    2:  History of cervical disc surgery few years back:  Neuropathy and weakness was severe prior to surgery but have improved since then  Start back on gabapentin as above    3:  MRI L-spine reviewed:  Stenosis with neural foraminal narrowing  Referred to Neurosurgery    4:  IMELDA positive1:640 homogeneous:  With negative panel  RF/CCP normal  ESR/CRP normal  Check further labs including complements and UA as above to rule out any active CTD    5:  Mild elevated CK, with normal aldolase:  Could be normal for his race, gender   Not on any statin  Since asymptomatic will monitor    6 month return    Risks vs Benefits and  potential side effects of medication prescribed today were discussed with patient. Medication literature given to patient up discharge  Went over uptodate information /literature on the meds prescribed today        Addendum:  Elevated LFTs noted new finding  Patient is completely asymptomatic,   Patient called and notified to go to the nearest ER for further evaluation which included an ultrasound  However he said he he would contact his PCP first   Explained if he cannot get in touch with his PCP then he needs to go to the nearest ER for further evaluation and stop Naprosyn  Further labs ordered including myositis panel and anti smooth muscle and antimitochondrial antibodies  Left message regarding this to Dr Lopez answering service     Disclaimer: This note was prepared using voice recognition system and is likely to have sound alike errors and is not proof read.  Please call me with any questions.

## 2018-11-12 ENCOUNTER — TELEPHONE (OUTPATIENT)
Dept: PHYSICAL MEDICINE AND REHAB | Facility: CLINIC | Age: 58
End: 2018-11-12

## 2018-11-12 ENCOUNTER — TELEPHONE (OUTPATIENT)
Dept: RHEUMATOLOGY | Facility: CLINIC | Age: 58
End: 2018-11-12

## 2018-11-12 LAB
DSDNA AB SER-ACNC: NORMAL [IU]/ML
HAV IGM SERPL QL IA: NEGATIVE
HBV CORE IGM SERPL QL IA: NEGATIVE
HBV SURFACE AG SERPL QL IA: NEGATIVE
HCV AB SERPL QL IA: NEGATIVE

## 2018-11-12 NOTE — TELEPHONE ENCOUNTER
Patient's wife would like to know if he can have his blood drawn at Dr. Lopez's office. If yes, please print external labs. Please advise.

## 2018-11-12 NOTE — TELEPHONE ENCOUNTER
Call patient's wife back today and informed her he needs to follow up with his PCP asap.  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 She will call to get him an appointment at his PCP.

## 2018-11-12 NOTE — TELEPHONE ENCOUNTER
Let them follow up with  primary care physician and they will order further labs  Since weekend  can can you check if they have seen their PCP?    If they have not seen anyone yet and would like to follow-up here then they would have to see a hepatologist/ liver specialist for further follow-up  Let me know what they would like to do

## 2018-11-12 NOTE — TELEPHONE ENCOUNTER
----- Message from Floridalma Loya MA sent at 11/9/2018  1:56 PM CST -----  Contact: wife      ----- Message -----  From: Paola Garcia  Sent: 11/9/2018   1:49 PM  To: [facility] Staff    Request a call concerning this pt, no additional info given, can be reached at [phone]///thxMW

## 2018-11-13 LAB
B2 GLYCOPROT1 IGA SER QL: <9 SAU
B2 GLYCOPROT1 IGG SER QL: <9 SGU
B2 GLYCOPROT1 IGM SER QL: <9 SMU
CARDIOLIPIN IGG SER IA-ACNC: <9.4 GPL
CARDIOLIPIN IGM SER IA-ACNC: <9.4 MPL
LA PPP-IMP: NEGATIVE

## 2018-11-13 NOTE — TELEPHONE ENCOUNTER
----- Message from Lizette Simpson sent at 11/13/2018  7:33 AM CST -----  Pt needs to speak to the nurse regarding his labs/please call [phone]/ma

## 2018-11-14 ENCOUNTER — TELEPHONE (OUTPATIENT)
Dept: RHEUMATOLOGY | Facility: CLINIC | Age: 58
End: 2018-11-14

## 2018-11-14 LAB — ALDOLASE SERPL-CCNC: 76.5 U/L

## 2018-11-14 NOTE — TELEPHONE ENCOUNTER
Call patient on the number listed, wife answered  There aware of elevated LFTs, aldolase, he is out of town  Per  her he is asymptomatic and so he has not followed up as advised  She would convey my message to him and see if he changes his mind  My advised would be to follow-up in an ER close to him

## 2019-04-10 ENCOUNTER — TELEPHONE (OUTPATIENT)
Dept: RHEUMATOLOGY | Facility: CLINIC | Age: 59
End: 2019-04-10

## 2019-04-10 NOTE — TELEPHONE ENCOUNTER
Called patient to reschedule appt on 05/24/19 due to provider will be out of office. No answer and unable to leave a voicemail due to box full.

## 2019-04-30 ENCOUNTER — TELEPHONE (OUTPATIENT)
Dept: RHEUMATOLOGY | Facility: CLINIC | Age: 59
End: 2019-04-30

## 2019-11-20 ENCOUNTER — TELEPHONE (OUTPATIENT)
Dept: RHEUMATOLOGY | Facility: CLINIC | Age: 59
End: 2019-11-20

## 2019-11-20 NOTE — TELEPHONE ENCOUNTER
----- Message from Jacqui Leal sent at 11/20/2019  9:47 AM CST -----  Contact: Pt  Type:  Patient Returning Call    Who Called:Roberto Sherman  Who Left Message for Patient:VICKEY REDDY  Does the patient know what this is regarding?:Appointment  Would the patient rather a call back or a response via [facility]? Call Back  Best Call Back Number:[phone] (home)   Additional Information:

## 2019-11-20 NOTE — TELEPHONE ENCOUNTER
----- Message from Lydia Del Valle sent at 11/20/2019  8:25 AM CST -----  Contact: Doris /wife  ...Type:  Sooner Apoointment Request    Caller is requesting a sooner appointment.  Caller declined first available appointment listed below.  Caller will not accept being placed on the waitlist and is requesting a message be sent to doctor.  Name of Callerpatient  When is the first available appointment?01/07/2020  Symptoms:joint pain  Would the patient rather a call back or a response via [facility]?   Best Call Back Number:..[phone] (home)     Additional Information:

## 2019-11-20 NOTE — TELEPHONE ENCOUNTER
Spoke with pt wife, scheduled pt next available pt wife states will consult with pt about appt, may or may not reschedule.

## 2020-01-31 ENCOUNTER — TELEPHONE (OUTPATIENT)
Dept: RHEUMATOLOGY | Facility: CLINIC | Age: 60
End: 2020-01-31

## 2020-01-31 NOTE — TELEPHONE ENCOUNTER
----- Message from Fatoumata Howard sent at 1/31/2020  9:50 AM CST -----  Contact: patient's wife  Would like to consult with nurse regarding appointment scheduled on March 6,2020.   Patients wife stated if he can be seen at an earlier time that would be perfect. Please call back at [phone]

## 2020-03-06 ENCOUNTER — OFFICE VISIT (OUTPATIENT)
Dept: RHEUMATOLOGY | Facility: CLINIC | Age: 60
End: 2020-03-06
Payer: COMMERCIAL

## 2020-03-06 ENCOUNTER — HOSPITAL ENCOUNTER (OUTPATIENT)
Dept: RADIOLOGY | Facility: HOSPITAL | Age: 60
Discharge: HOME | End: 2020-03-06
Attending: INTERNAL MEDICINE
Payer: COMMERCIAL

## 2020-03-06 ENCOUNTER — TELEPHONE (OUTPATIENT)
Dept: RHEUMATOLOGY | Facility: CLINIC | Age: 60
End: 2020-03-06

## 2020-03-06 ENCOUNTER — LAB VISIT (OUTPATIENT)
Dept: LAB | Facility: HOSPITAL | Age: 60
End: 2020-03-06
Attending: INTERNAL MEDICINE
Payer: COMMERCIAL

## 2020-03-06 VITALS
BODY MASS INDEX: 27.67 KG/M2 | HEART RATE: 77 BPM | DIASTOLIC BLOOD PRESSURE: 101 MMHG | WEIGHT: 187.38 LBS | SYSTOLIC BLOOD PRESSURE: 173 MMHG

## 2020-03-06 DIAGNOSIS — M25.551 PAIN OF BOTH HIP JOINTS: ICD-10-CM

## 2020-03-06 DIAGNOSIS — M25.552 PAIN OF BOTH HIP JOINTS: ICD-10-CM

## 2020-03-06 DIAGNOSIS — R74.8 ELEVATED CK: ICD-10-CM

## 2020-03-06 DIAGNOSIS — M48.061 SPINAL STENOSIS OF LUMBAR REGION, UNSPECIFIED WHETHER NEUROGENIC CLAUDICATION PRESENT: Primary | ICD-10-CM

## 2020-03-06 DIAGNOSIS — R76.8 ANA POSITIVE: ICD-10-CM

## 2020-03-06 LAB
BILIRUB UR QL STRIP: NEGATIVE
CLARITY UR: CLEAR
COLOR UR: YELLOW
GLUCOSE UR QL STRIP: NEGATIVE
HGB UR QL STRIP: NEGATIVE
KETONES UR QL STRIP: NEGATIVE
LEUKOCYTE ESTERASE UR QL STRIP: NEGATIVE
NITRITE UR QL STRIP: NEGATIVE
PH UR STRIP: 6 [PH] (ref 5–8)
PROT UR QL STRIP: NEGATIVE
SP GR UR STRIP: 1.02 (ref 1–1.03)
URN SPEC COLLECT METH UR: NORMAL

## 2020-03-06 PROCEDURE — 81003 URINALYSIS AUTO W/O SCOPE: CPT

## 2020-03-06 PROCEDURE — 99215 OFFICE O/P EST HI 40 MIN: CPT | Mod: S$GLB,,, | Performed by: INTERNAL MEDICINE

## 2020-03-06 PROCEDURE — 73521 X-RAY EXAM HIPS BI 2 VIEWS: CPT | Mod: 26,,, | Performed by: RADIOLOGY

## 2020-03-06 PROCEDURE — 72200 X-RAY EXAM SI JOINTS: CPT | Mod: TC

## 2020-03-06 PROCEDURE — 73521 X-RAY EXAM HIPS BI 2 VIEWS: CPT | Mod: TC

## 2020-03-06 PROCEDURE — 99999 PR PBB SHADOW E&M-EST. PATIENT-LVL III: ICD-10-PCS | Mod: PBBFAC,,, | Performed by: INTERNAL MEDICINE

## 2020-03-06 PROCEDURE — 72200 XR SACROILIAC JOINTS 3 VIEWS: ICD-10-PCS | Mod: 26,,, | Performed by: RADIOLOGY

## 2020-03-06 PROCEDURE — 72200 X-RAY EXAM SI JOINTS: CPT | Mod: 26,,, | Performed by: RADIOLOGY

## 2020-03-06 PROCEDURE — 99999 PR PBB SHADOW E&M-EST. PATIENT-LVL III: CPT | Mod: PBBFAC,,, | Performed by: INTERNAL MEDICINE

## 2020-03-06 PROCEDURE — 3008F PR BODY MASS INDEX (BMI) DOCUMENTED: ICD-10-PCS | Mod: CPTII,S$GLB,, | Performed by: INTERNAL MEDICINE

## 2020-03-06 PROCEDURE — 3008F BODY MASS INDEX DOCD: CPT | Mod: CPTII,S$GLB,, | Performed by: INTERNAL MEDICINE

## 2020-03-06 PROCEDURE — 73521 XR HIPS BILATERAL 2 VIEW INCL AP PELVIS: ICD-10-PCS | Mod: 26,,, | Performed by: RADIOLOGY

## 2020-03-06 PROCEDURE — 99215 PR OFFICE/OUTPT VISIT, EST, LEVL V, 40-54 MIN: ICD-10-PCS | Mod: S$GLB,,, | Performed by: INTERNAL MEDICINE

## 2020-03-06 RX ORDER — METHOCARBAMOL 750 MG/1
750 TABLET, FILM COATED ORAL 2 TIMES DAILY PRN
Qty: 60 TABLET | Refills: 1 | Status: SHIPPED | OUTPATIENT
Start: 2020-03-06 | End: 2020-05-22 | Stop reason: SDUPTHER

## 2020-03-06 RX ORDER — GABAPENTIN 100 MG/1
100 CAPSULE ORAL 2 TIMES DAILY
Qty: 60 CAPSULE | Refills: 5 | Status: SHIPPED | OUTPATIENT
Start: 2020-03-06 | End: 2020-05-22 | Stop reason: SDUPTHER

## 2020-03-06 NOTE — TELEPHONE ENCOUNTER
----- Message from Mariana Hernandez sent at 3/6/2020  1:13 PM CST -----  Contact: Doris-SO  Pt is running behind schedule. States that that there leaving from [place] and they'll be 30-40 min late. Call back at [phone]

## 2020-03-06 NOTE — PROGRESS NOTES
CC:  Chief Complaint   Patient presents with    Spinal Stenosis     f/u- pain in lower back & back of thighs        History of Present Illness:  Roberto Flores a 59 y.o.yo male presents for follow-up   Seen in 2018 last    He was seen back then for chronic neck pain but now the neck pain seems to be stable no radiation of pain or no tingling numbness in the upper extremities  He is now more concerned about bilateral hip pain with radiation down both the thighs, up to knees  He notes early morning stiffness and resting stiffness in both the hips  He feels better moving actually  He takes Tylenol as needed  No loss of sensation  MRI was suggestive of spinal stenosis,   He was advised to follow up with neurosurgeon, he decided to go back to [place] to follow up with his neurosurgeon  Per  him no further surgical intervention was recommended    In the past he has used gabapentin for neck pain and this has helped  He also had a cervical neck surgery in the past done by  neurosurgeon at [place]    positive IMELDA 1 in 640 and mild elevation in CK  He however denies any rash, joint swelling, history of uveitis.  He denies any chest pain or shortness of breath.  Denies any fever chills, weight loss    No family history of any connective tissue disease  Denies any history of hepatitis he is aware of      Last visit he was noted to have elevated LFTs and aldolase he followed up with his PCP back then in 2018 and they normalized per him     History:    Shoulder pain:  Chronic, right greater than left   Pain aggravated with range of motion.  It  hurts at rest as well  It radiates down the upper arms.   With intermittent  he notes tingling in both the hands.  It resolves spontaneously  No shoulder swelling noted.  No hand weakness  In 2016 he had a neck surgery in [place] .  Cervical disc surgery from what he says  He noted  the shoulder pain 6 months after surgery.  He followed up with the surgeon   No abnormalities  noted and he was given Mobic  It helped for a while.   Now he takes Tylenol Extra Strength as needed with some relief.  He was on gabapentin in the past but stopped as it did not help much    Chronic low back pain:  He has noted worsening in the last 8-9 months.  Sometimes radiates down the right lower extremity.  No tingling numbness.  No incontinence noted  He had x-rays done by PCP and was told nothing significant  But the pain persisted and continued to worsen.    No joint swelling noted  He sometimes hurts in his knees.  This can be seen after periods  of prolonged resting or activity  He is a  not sure if she can do PT now    He is having a cough on lisinopril and stopped , due to f/u PCP   No rashes, fever, chills, sicca syndrome          Past Medical History:   Diagnosis Date    Hypertension 8/2016       Past Surgical History:   Procedure Laterality Date    CERVICAL DISC SURGERY           Social History     Tobacco Use    Smoking status: Never Smoker   Substance Use Topics    Alcohol use: Yes     Comment: occassionaly    Drug use: No       History reviewed. No pertinent family history.     None for CTD     Review of patient's allergies indicates:  No Known Allergies          Review of Systems:  Constitutional: Denies fever, chills. No recent weight changes.   Fatigue: no  Muscle weakness: no  Headaches: no new headaches  Eyes: No redness or dryness.  No recent visual changes.  ENT: Denies dry mouth. No oral or nasal ulcers.  Card: No chest pain.  Resp: No cough or sob.   Gastro: No nausea or vomiting.  No heartburn.  Constipation: no  Diarrhea: no  Genito:  No dysuria.  No genital ulcers.  Skin: No rash.  Raynauds:no  Neuro:+ hands intermittently   Psych: No depression, anxiety  Endo:  no excess thirst.  Heme: no abnormal bleeding or bruising  Clots:none   Miscarriages :     OBJECTIVE:     Vital Signs   Vitals:    03/06/20 1431   BP: (!) 173/101   Pulse: 77     Physical Exam:  General  Appearance:  NAD.   Gait: not favoring.  HEENT: PERRL.  Eyes not dry or injected.  No nasal ulcers.  Mouth not dry, no oral lesions.  Lymph: cervical, supraclavicular or axillary nodes: none abnormal   Cardio: no irregularity of S1 or S2.  No gallops or rubs.   Resp: Normal respiratory motion. Clear to auscultation bilaterally.   No abnormal chest conformation.  Abd: Soft, non-tender, nondistended.  No masses.   Skin: Head and neck,  and extremities examined. No rashes.   Neuro: Ox3.   Cranial nerves II-XII grossly intact.   Sensation intact  in both distal LE and upper extremities to light touch.  Musculoskeletal Exam:    Right Side Rheumatological Exam     Examination finds the shoulder: pain on overhead abduction but normal ROM   elbow, wrist, , 1st PIP, 1st MCP, 2nd PIP, 2nd MCP, 3rd PIP, 3rd MCP, 4th PIP, 4th MCP, 5th PIP and 5th MCP no synovitis   Knee : crepitus   Others :  Hip: No synovitis   Ankle :No synovitis   Foot:No synovitis     Left Side Rheumatological Exam     Examination finds the shoulder: pain on overhead abduction but normal ROM    elbow, wrist, 1st PIP, 1st MCP, 2nd PIP, 2nd MCP, 3rd PIP, 3rd MCP, 4th PIP, 4th MCP, 5th PIP and 5th MCP no synovitis   Knee : crepitus   Others :  Hip:No synovitis   Ankle :No synovitis   Foot:No synovitis     Spine :  Tenderness in lower lumbar facet region  Occiput to wall :neg   Modified schobers :neg       Muscle strength:Equal and full in all mm groups of the upper and lower ext.    Laboratory:   Results for orders placed or performed in visit on 11/09/18   Urinalysis   Result Value Ref Range    Specimen UA Urine, Clean Catch     Color, UA Poonam Yellow, Straw, Poonam    Appearance, UA Clear Clear    pH, UA 6.0 5.0 - 8.0    Specific Gravity, UA >=1.030 (A) 1.005 - 1.030    Protein, UA Negative Negative    Glucose, UA Negative Negative    Ketones, UA 2+ (A) Negative    Bilirubin (UA) 2+ (A) Negative    Occult Blood UA Negative Negative    Nitrite, UA Negative  Negative    Leukocytes, UA Negative Negative   Protein / creatinine ratio, urine   Result Value Ref Range    Protein, Urine Random 19 (H) 0 - 15 mg/dL    Creatinine, Random Ur 233.0 23.0 - 375.0 mg/dL    Prot/Creat Ratio, Ur 0.08 0.00 - 0.20     Imaging :L4-L5 asymmetric mild right neural foraminal encroachment and spinal canal narrowing secondary to disc protrusion and facet and ligamentum flavum hypertrophy.  There is also mild spinal canal narrowing at L3-L4 as discussed above.    Notes reviewed  Other procedures:    ASSESSMENT/PLAN:     Spinal stenosis of lumbar region, unspecified whether neurogenic claudication present  -     Ambulatory referral/consult to Pain Clinic; Future; Expected date: 03/13/2020    IMELDA positive  -     CBC auto differential; Standing  -     Comprehensive metabolic panel; Standing  -     C-reactive protein; Standing  -     Sedimentation rate; Standing  -     Anti-DNA antibody, double-stranded; Standing; Expected date: 03/06/2020  -     C4 complement; Standing; Expected date: 03/06/2020  -     C3 complement; Standing; Expected date: 03/06/2020  -     CK; Standing  -     Aldolase; Standing  -     Urinalysis; Standing  -     HLA B27 Antigen; Future; Expected date: 03/06/2020  -     Lactate dehydrogenase; Standing    Elevated CK    Pain of both hip joints  -     X-Ray Hips Bilateral 2 View Inc AP Pelvis; Future; Expected date: 03/06/2020  -     X-Ray Sacroiliac Joints 3 Views; Future; Expected date: 03/06/2020        1:  Lumbar spinal stenosis with radiculopathy:  For his neurosurgeon nosurgical management needed   Start gabapentin 100 mg p.o. q.h.s. and if well tolerated increase to 100 mg p.o. B.i.d.  Robaxin twice daily as need   Refer  to pain management    2:  IMELDA positive1:640 homogeneous:  With negative panel  RF/CCP normal  Check labs to assess disease activity    3:  Mild elevated CK, with normal aldolase:  Could be normal for his race, gender   Not on any statin  Asymptomatic  Check  myositis panel     4:  Bilateral hip pain:  Check x-ray hips/SI joint    6 month return    Risks vs Benefits and potential side effects of medication prescribed today were discussed with patient. Medication literature given to patient up discharge  Went over uptodate information /literature on the meds prescribed today        Disclaimer: This note was prepared using voice recognition system and is likely to have sound alike errors and is not proof read.  Please call me with any questions.

## 2020-03-09 ENCOUNTER — TELEPHONE (OUTPATIENT)
Dept: RHEUMATOLOGY | Facility: CLINIC | Age: 60
End: 2020-03-09

## 2020-03-09 NOTE — TELEPHONE ENCOUNTER
----- Message from Minerva Dominguez MD sent at 3/9/2020  9:40 AM CDT -----  Please let him know only mild arthritis noted in hips  Otherwise x-rays look good

## 2020-03-10 DIAGNOSIS — R79.89 ELEVATED LFTS: ICD-10-CM

## 2020-03-10 DIAGNOSIS — M60.9 MYOSITIS, UNSPECIFIED MYOSITIS TYPE, UNSPECIFIED SITE: Primary | ICD-10-CM

## 2020-03-11 ENCOUNTER — TELEPHONE (OUTPATIENT)
Dept: RHEUMATOLOGY | Facility: CLINIC | Age: 60
End: 2020-03-11

## 2020-03-11 NOTE — TELEPHONE ENCOUNTER
----- Message from Minerva Dominguez MD sent at 3/10/2020  6:01 PM CDT -----  Please let him know his LFTS are still elevated, muscle enzymes are elevated   I would recommend MRI thighs to r/o any possible muscle disease and also EMG studies   Would also recommend see Hepatology   All orders placed

## 2020-03-17 ENCOUNTER — TELEPHONE (OUTPATIENT)
Dept: RHEUMATOLOGY | Facility: CLINIC | Age: 60
End: 2020-03-17

## 2020-03-17 ENCOUNTER — TELEPHONE (OUTPATIENT)
Dept: PHYSICAL MEDICINE AND REHAB | Facility: CLINIC | Age: 60
End: 2020-03-17

## 2020-03-17 NOTE — TELEPHONE ENCOUNTER
----- Message from Aayush Madrigal sent at 3/17/2020 12:58 PM CDT -----  Contact: pt   Type:  Patient Returning Call    Who Called: pt   Who Left Message for Patient:nurse   Does the patient know what this is regarding?:unknown to pt   Would the patient rather a call back or a response via [facility]? phone  Best Call Back Number: [phone]  Additional Information:

## 2020-03-17 NOTE — TELEPHONE ENCOUNTER
Returned call to pt, wife states he is not available. She asked if she could take msg. Advised her of Dr. Dominguez's msg. She requested to have MRI scheduled on 3/23/20 after Dr. Jeffers visit.

## 2020-03-20 ENCOUNTER — TELEPHONE (OUTPATIENT)
Dept: PAIN MEDICINE | Facility: CLINIC | Age: 60
End: 2020-03-20

## 2020-03-20 ENCOUNTER — TELEPHONE (OUTPATIENT)
Dept: RHEUMATOLOGY | Facility: CLINIC | Age: 60
End: 2020-03-20

## 2020-03-20 ENCOUNTER — TELEPHONE (OUTPATIENT)
Dept: RADIOLOGY | Facility: HOSPITAL | Age: 60
End: 2020-03-20

## 2020-03-20 NOTE — TELEPHONE ENCOUNTER
----- Message from Taya Flores sent at 3/20/2020  3:29 PM CDT -----  Contact: Doris-spouse  Doris requesting a call back to know if something can be prescribed for pain for the pt. She states that his MRI and appts have been changed to a later date and the pt has been uncomfortable. Please call Doris back at [phone]

## 2020-03-20 NOTE — TELEPHONE ENCOUNTER
Dr. Dominguez please advise  Mr. 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

## 2020-03-20 NOTE — TELEPHONE ENCOUNTER
----- Message from Mauricio Medina sent at 3/20/2020  3:14 PM CDT -----  Contact: Pt wife Doris  Pt wife Doris called and would like to know why his appt needs to be reschedule    Doris can be reached at [phone]

## 2020-03-20 NOTE — TELEPHONE ENCOUNTER
----- Message from Brian Cade sent at 3/20/2020  4:19 PM CDT -----  Contact: pt wife Doris   Pt is calling the staff regarding something for discomfort pain.    Pt call back to be advised of the RX  [phone]    [id number] - [place], [place] - [address] [place]  [place] [address]  Phone: [phone] Fax: [phone]    Thanks

## 2020-03-20 NOTE — TELEPHONE ENCOUNTER
----- Message from Trang Edmond sent at 3/20/2020 10:35 AM CDT -----  Good Morning!    Due to concerns associated with Covid19 the radiology team is seeking to reschedule outpatient diagnostic exams between 3/21 - 4/21 that have been ordered prior to 3/19.    Please respond to this message if you believe it is safe to postpone this exam and the radiology team will reschedule and update you on the patient's response.  If you have concerns that postponement is not safe (urgent or time sensitive diagnostic study), then reply and it will be performed as ordered.   If further discussion needed, please provide a contact number and a Radiologist will reach out to you shortly.    Thank You,    Trang MORTON  Radiology Dept

## 2020-03-20 NOTE — TELEPHONE ENCOUNTER
PT is a  and is not able to make it in due to the work load spoke with the MRI team and they was in the process of calling pt to let him know he would need to be rescheduled for later in April all questions answered at this time//dp

## 2020-03-23 ENCOUNTER — TELEPHONE (OUTPATIENT)
Dept: RHEUMATOLOGY | Facility: CLINIC | Age: 60
End: 2020-03-23

## 2020-03-23 NOTE — TELEPHONE ENCOUNTER
----- Message from Emigdio Hancock sent at 3/23/2020 10:32 AM CDT -----  Contact: Self- [phone]  .Type:  Patient Returning Call    Who Called:Roberto Sherman  Who Left Message for Patient:Halle   Does the patient know what this is regarding?:unsure   Would the patient rather a call back or a response via [facility]? Call back   Best Call Back Number:.[phone](home)   Additional Information:     Thank you,   Emigdio Hancock

## 2020-03-23 NOTE — TELEPHONE ENCOUNTER
Returned call to pt, advised of Dr. Dominguez's msg regarding req for pain med. Pt verbalized understanding also gave him number to [facility] .

## 2020-03-23 NOTE — TELEPHONE ENCOUNTER
Called pt to advise of Dr. Dominguez's msg. Pt's wife states he is unavailable & she can take the msg. Pt's wife verbalized understanding

## 2020-04-02 ENCOUNTER — TELEPHONE (OUTPATIENT)
Dept: PHYSICAL MEDICINE AND REHAB | Facility: CLINIC | Age: 60
End: 2020-04-02

## 2020-04-02 NOTE — TELEPHONE ENCOUNTER
----- Message from Yaa Lopez sent at 4/2/2020  2:56 PM CDT -----  Contact: pt spouse, jaxon [place]  Caller is calling in regards to 04/24/2020 appointment, caller can be reached at [phone] (eugn) . Thanks.

## 2020-04-14 ENCOUNTER — DOCUMENTATION ONLY (OUTPATIENT)
Dept: TRANSPLANT | Facility: CLINIC | Age: 60
End: 2020-04-14

## 2020-04-14 NOTE — LETTER
April 14, 2020    Roberto Sherman  [address] [place] [address]      Dear Roberto Sherman:    Your doctor has referred you to the [facility]. We are sending this letter to remind you to make an appointment with us to complete the referral process.     Please call us at [phone] to schedule an appointment. We look forward to seeing you soon.     If you received a call and have been scheduled, please disregard this letter.       Sincerely,        [facility]   [address] [place] [id number]  [phone]

## 2020-04-14 NOTE — NURSING
Referral from work que.  Records reviewed.  Pt referred for elevated LFTs.  Referred by Minerva Dominguez MD. Message sent to Hepatology scheduling.  Letter sent to patient

## 2020-04-21 ENCOUNTER — TELEPHONE (OUTPATIENT)
Dept: RHEUMATOLOGY | Facility: CLINIC | Age: 60
End: 2020-04-21

## 2020-04-21 NOTE — TELEPHONE ENCOUNTER
----- Message from Doris Arevalo sent at 4/21/2020  4:34 PM CDT -----  Contact: wife/ doris correa  Doris would like a call back to know why her  was referred to a liver doctor and can be reached at [phone]      Thanks,  Doris Arevalo

## 2020-04-22 ENCOUNTER — TELEPHONE (OUTPATIENT)
Dept: RHEUMATOLOGY | Facility: CLINIC | Age: 60
End: 2020-04-22

## 2020-04-22 NOTE — TELEPHONE ENCOUNTER
Pt wife requesting info on why her  received a letter in the mail for a referral [facility]. Please advise.

## 2020-04-23 DIAGNOSIS — R79.89 ELEVATED LFTS: Primary | ICD-10-CM

## 2020-04-27 ENCOUNTER — TELEPHONE (OUTPATIENT)
Dept: GASTROENTEROLOGY | Facility: CLINIC | Age: 60
End: 2020-04-27

## 2020-04-27 ENCOUNTER — TELEPHONE (OUTPATIENT)
Dept: HEPATOLOGY | Facility: CLINIC | Age: 60
End: 2020-04-27

## 2020-04-27 NOTE — TELEPHONE ENCOUNTER
----- Message from Odalys Grant sent at 4/27/2020 12:58 PM CDT -----  Please schedule his salvatore. See previous message.  Tks.

## 2020-04-27 NOTE — TELEPHONE ENCOUNTER
Called and spoke with wife regarding scheduling appt with Mirian Jimenez NP. Wife unsure of  schedule/availability because he is a  and he is currently on the road.

## 2020-04-28 ENCOUNTER — TELEPHONE (OUTPATIENT)
Dept: GASTROENTEROLOGY | Facility: CLINIC | Age: 60
End: 2020-04-28

## 2020-04-28 NOTE — TELEPHONE ENCOUNTER
----- Message from Dalila Leslie sent at 4/16/2020 10:49 AM CDT -----  Regarding: REFERRAL  Hi, can Mr. Sherman be scheduled with one the [facility], since he receives most of his care in the [facility] and the referral is for the  location?    Thanks in advance!    Dalila,    ----- Message -----  From: Kanwal Mcmahon  Sent: 4/14/2020   3:14 PM CDT  To: Hepatology Scheduling    Referral from work que. Records reviewed. Pt referred for elevated LFTs. Referred by Minerva Dominguez MD. Message sent to Hepatology scheduling. Letter sent to patient

## 2020-04-28 NOTE — TELEPHONE ENCOUNTER
Attempted to contact patient at [phone] with no answer. Unable to leave a voicemail message for patient.

## 2020-04-29 ENCOUNTER — TELEPHONE (OUTPATIENT)
Dept: GASTROENTEROLOGY | Facility: CLINIC | Age: 60
End: 2020-04-29

## 2020-04-29 NOTE — TELEPHONE ENCOUNTER
Spoke with patient wife who states that patient is not available at this time. Best to contact patient at [phone]. Advised wife that I would contact patient at phone number provided.

## 2020-04-29 NOTE — TELEPHONE ENCOUNTER
----- Message from Anjelica Coley sent at 4/29/2020 10:12 AM CDT -----  Contact: pt   Type:  Patient Returning Call    Who Called:pt   Who Left Message for Patient: Chela  Does the patient know what this is regarding?: appointment  Would the patient rather a call back or a response via [facility]? Call back  Best Call Back Number: [phone]   Additional Information:

## 2020-05-04 ENCOUNTER — TELEPHONE (OUTPATIENT)
Dept: RHEUMATOLOGY | Facility: CLINIC | Age: 60
End: 2020-05-04

## 2020-05-04 NOTE — TELEPHONE ENCOUNTER
----- Message from Kwame Yates sent at 5/4/2020  8:09 AM CDT -----  Contact: Doris   Would like to see about rescheduling upcoming an upcoming appt. pls return call.      [phone]

## 2020-05-04 NOTE — TELEPHONE ENCOUNTER
Returned chad to pt, spk wife, states she wants to move the 6/8/20 appt to 5/22/20 when he sees Dr. Govea. Advised need to spk to Dr. Dominguez first to see if will be in clinic that day due to COVID. Per Dr. Dominguez things will be resumed at normal & called back to advise the appt was rescheuled to 5/22/20

## 2020-05-22 ENCOUNTER — OFFICE VISIT (OUTPATIENT)
Dept: RHEUMATOLOGY | Facility: CLINIC | Age: 60
End: 2020-05-22
Payer: COMMERCIAL

## 2020-05-22 ENCOUNTER — OFFICE VISIT (OUTPATIENT)
Dept: PHYSICAL MEDICINE AND REHAB | Facility: CLINIC | Age: 60
End: 2020-05-22
Payer: COMMERCIAL

## 2020-05-22 ENCOUNTER — TELEPHONE (OUTPATIENT)
Dept: RHEUMATOLOGY | Facility: CLINIC | Age: 60
End: 2020-05-22

## 2020-05-22 ENCOUNTER — LAB VISIT (OUTPATIENT)
Dept: LAB | Facility: HOSPITAL | Age: 60
End: 2020-05-22
Attending: INTERNAL MEDICINE
Payer: COMMERCIAL

## 2020-05-22 ENCOUNTER — TELEPHONE (OUTPATIENT)
Dept: ORTHOPEDICS | Facility: CLINIC | Age: 60
End: 2020-05-22

## 2020-05-22 VITALS
HEART RATE: 80 BPM | RESPIRATION RATE: 12 BRPM | SYSTOLIC BLOOD PRESSURE: 164 MMHG | HEIGHT: 69 IN | DIASTOLIC BLOOD PRESSURE: 100 MMHG | WEIGHT: 187 LBS | BODY MASS INDEX: 27.7 KG/M2

## 2020-05-22 VITALS
BODY MASS INDEX: 27.22 KG/M2 | DIASTOLIC BLOOD PRESSURE: 91 MMHG | WEIGHT: 184.31 LBS | HEART RATE: 79 BPM | SYSTOLIC BLOOD PRESSURE: 138 MMHG

## 2020-05-22 DIAGNOSIS — M51.36 LUMBAR DEGENERATIVE DISC DISEASE: Primary | ICD-10-CM

## 2020-05-22 DIAGNOSIS — R79.89 ELEVATED LFTS: ICD-10-CM

## 2020-05-22 DIAGNOSIS — M54.16 ACUTE LUMBAR RADICULOPATHY: ICD-10-CM

## 2020-05-22 DIAGNOSIS — R76.8 ANA POSITIVE: ICD-10-CM

## 2020-05-22 DIAGNOSIS — R79.89 ABNORMAL LFTS: ICD-10-CM

## 2020-05-22 LAB
A1AT SERPL-MCNC: 124 MG/DL (ref 100–190)
ALBUMIN SERPL BCP-MCNC: 4.1 G/DL (ref 3.5–5.2)
ALP SERPL-CCNC: 74 U/L (ref 55–135)
ALT SERPL W/O P-5'-P-CCNC: 53 U/L (ref 10–44)
AST SERPL-CCNC: 33 U/L (ref 10–40)
BILIRUB DIRECT SERPL-MCNC: 0.2 MG/DL (ref 0.1–0.3)
BILIRUB SERPL-MCNC: 0.5 MG/DL (ref 0.1–1)
CERULOPLASMIN SERPL-MCNC: 30 MG/DL (ref 15–45)
CK SERPL-CCNC: 435 U/L (ref 20–200)
FERRITIN SERPL-MCNC: 701 NG/ML (ref 20–300)
GGT SERPL-CCNC: 181 U/L (ref 8–55)
IGA SERPL-MCNC: 248 MG/DL (ref 40–350)
IGG SERPL-MCNC: 1130 MG/DL (ref 650–1600)
IGM SERPL-MCNC: 114 MG/DL (ref 50–300)
IRON SERPL-MCNC: 72 UG/DL (ref 45–160)
PROT SERPL-MCNC: 7.6 G/DL (ref 6–8.4)
SATURATED IRON: 20 % (ref 20–50)
TOTAL IRON BINDING CAPACITY: 369 UG/DL (ref 250–450)
TRANSFERRIN SERPL-MCNC: 249 MG/DL (ref 200–375)

## 2020-05-22 PROCEDURE — 99999 PR PBB SHADOW E&M-EST. PATIENT-LVL III: CPT | Mod: PBBFAC,,, | Performed by: PHYSICAL MEDICINE & REHABILITATION

## 2020-05-22 PROCEDURE — 95909 NRV CNDJ TST 5-6 STUDIES: CPT | Mod: S$GLB,,, | Performed by: PHYSICAL MEDICINE & REHABILITATION

## 2020-05-22 PROCEDURE — 86790 VIRUS ANTIBODY NOS: CPT

## 2020-05-22 PROCEDURE — 82784 ASSAY IGA/IGD/IGG/IGM EACH: CPT

## 2020-05-22 PROCEDURE — 36415 COLL VENOUS BLD VENIPUNCTURE: CPT

## 2020-05-22 PROCEDURE — 82728 ASSAY OF FERRITIN: CPT

## 2020-05-22 PROCEDURE — 87340 HEPATITIS B SURFACE AG IA: CPT

## 2020-05-22 PROCEDURE — 86706 HEP B SURFACE ANTIBODY: CPT

## 2020-05-22 PROCEDURE — 99214 PR OFFICE/OUTPT VISIT, EST, LEVL IV, 30-39 MIN: ICD-10-PCS | Mod: 25,S$GLB,, | Performed by: PHYSICAL MEDICINE & REHABILITATION

## 2020-05-22 PROCEDURE — 99999 PR PBB SHADOW E&M-EST. PATIENT-LVL III: ICD-10-PCS | Mod: PBBFAC,,, | Performed by: INTERNAL MEDICINE

## 2020-05-22 PROCEDURE — 82103 ALPHA-1-ANTITRYPSIN TOTAL: CPT

## 2020-05-22 PROCEDURE — 95886 MUSC TEST DONE W/N TEST COMP: CPT | Mod: S$GLB,,, | Performed by: PHYSICAL MEDICINE & REHABILITATION

## 2020-05-22 PROCEDURE — 82390 ASSAY OF CERULOPLASMIN: CPT

## 2020-05-22 PROCEDURE — 95886 PR EMG COMPLETE, W/ NERVE CONDUCTION STUDIES, 5+ MUSCLES: ICD-10-PCS | Mod: S$GLB,,, | Performed by: PHYSICAL MEDICINE & REHABILITATION

## 2020-05-22 PROCEDURE — 3008F BODY MASS INDEX DOCD: CPT | Mod: CPTII,S$GLB,, | Performed by: PHYSICAL MEDICINE & REHABILITATION

## 2020-05-22 PROCEDURE — 86256 FLUORESCENT ANTIBODY TITER: CPT | Mod: 91

## 2020-05-22 PROCEDURE — 3008F PR BODY MASS INDEX (BMI) DOCUMENTED: ICD-10-PCS | Mod: CPTII,S$GLB,, | Performed by: INTERNAL MEDICINE

## 2020-05-22 PROCEDURE — 3008F BODY MASS INDEX DOCD: CPT | Mod: CPTII,S$GLB,, | Performed by: INTERNAL MEDICINE

## 2020-05-22 PROCEDURE — 3008F PR BODY MASS INDEX (BMI) DOCUMENTED: ICD-10-PCS | Mod: CPTII,S$GLB,, | Performed by: PHYSICAL MEDICINE & REHABILITATION

## 2020-05-22 PROCEDURE — 99999 PR PBB SHADOW E&M-EST. PATIENT-LVL III: CPT | Mod: PBBFAC,,, | Performed by: INTERNAL MEDICINE

## 2020-05-22 PROCEDURE — 82977 ASSAY OF GGT: CPT

## 2020-05-22 PROCEDURE — 86803 HEPATITIS C AB TEST: CPT

## 2020-05-22 PROCEDURE — 86235 NUCLEAR ANTIGEN ANTIBODY: CPT

## 2020-05-22 PROCEDURE — 82550 ASSAY OF CK (CPK): CPT

## 2020-05-22 PROCEDURE — 80076 HEPATIC FUNCTION PANEL: CPT

## 2020-05-22 PROCEDURE — 83540 ASSAY OF IRON: CPT

## 2020-05-22 PROCEDURE — 99214 OFFICE O/P EST MOD 30 MIN: CPT | Mod: 25,S$GLB,, | Performed by: PHYSICAL MEDICINE & REHABILITATION

## 2020-05-22 PROCEDURE — 99215 OFFICE O/P EST HI 40 MIN: CPT | Mod: S$GLB,,, | Performed by: INTERNAL MEDICINE

## 2020-05-22 PROCEDURE — 99999 PR PBB SHADOW E&M-EST. PATIENT-LVL III: ICD-10-PCS | Mod: PBBFAC,,, | Performed by: PHYSICAL MEDICINE & REHABILITATION

## 2020-05-22 PROCEDURE — 95909 PR NERVE CONDUCTION STUDY; 5-6 STUDIES: ICD-10-PCS | Mod: S$GLB,,, | Performed by: PHYSICAL MEDICINE & REHABILITATION

## 2020-05-22 PROCEDURE — 99215 PR OFFICE/OUTPT VISIT, EST, LEVL V, 40-54 MIN: ICD-10-PCS | Mod: S$GLB,,, | Performed by: INTERNAL MEDICINE

## 2020-05-22 RX ORDER — METHOCARBAMOL 750 MG/1
750 TABLET, FILM COATED ORAL 2 TIMES DAILY PRN
Qty: 60 TABLET | Refills: 1 | Status: SHIPPED | OUTPATIENT
Start: 2020-05-22

## 2020-05-22 RX ORDER — GABAPENTIN 100 MG/1
100 CAPSULE ORAL 2 TIMES DAILY
Qty: 60 CAPSULE | Refills: 5 | Status: SHIPPED | OUTPATIENT
Start: 2020-05-22

## 2020-05-22 RX ORDER — AMLODIPINE BESYLATE 5 MG/1
5 TABLET ORAL DAILY
COMMUNITY

## 2020-05-22 NOTE — TELEPHONE ENCOUNTER
----- Message from Maye Christopher sent at 5/22/2020 10:56 AM CDT -----  Contact: self  Pt states on 07/27/2020 he has a 10 am appt for imaging for liver scan and an 11:30 for Dr Blanco and everything will be done the same day. He just wanted  to know his plans. Please call back [phone]  Thanks.

## 2020-05-22 NOTE — LETTER
May 22, 2020      Minerva Dominguez MD  [address] [place] [address] [address] [place] [address]           [facility]  [address] [facility]  [place] [address]  Phone: [phone]  Fax: [phone]          Patient: Roberto Sherman   MR Number: [id number]   YOB: 1960   Date of Visit: 5/22/2020       Dear Dr. Minerva Dominguez:    Thank you for referring Roberto Sherman to me for evaluation. Attached you will find relevant portions of my assessment and plan of care.    If you have questions, please do not hesitate to call me. I look forward to following Roberto Sherman along with you.    Sincerely,    Blanka Govea MD    Enclosure  CC:  No Recipients    If you would like to receive this communication electronically, please contact [email or web address] or [phone] to request more information on [employer] Link access.    For providers and/or their staff who would like to refer a patient to [facility], please contact us through our one-stop-shop provider referral line, [facility], at [phone].    If you feel you have received this communication in error or would no longer like to receive these types of communications, please e-mail [email or web address]

## 2020-05-22 NOTE — PROGRESS NOTES
[facility]   [address] [facility] [place] [address]  Phone: [phone]        Full Name: tristan correa YOB: 1960  Patient ID: [id number]      Visit Date: 5/22/2020 07:57  Age: 59 Years 9 Months Old  Examining Physician: Blanka Govea M.D.  Referring Physician: Dr Dominguez  Reason for Referral: leg pain    Chief Complaint   Patient presents with    Back Pain     leg pain       HPI: This is a 59 y.o.  male being seen in clinic today for evaluation of chronic low back pain with radiation into his posterior thighs.  He has stiffness upon first getting up that improves with movement.  He admits to walking with forward flexion for relief.  He has known lumbar spinal stenosis with and a history of cervical ACDF.  He still has leg weakness/stiffness with impaired gait. He denies b/b incontinence.    History obtained from patient    Past family, medical, social, and surgical history reviewed in chart    Review of Systems:     General- denies lethargy, weight change, fever, chills  Head/neck- denies swallowing difficulties  ENT- denies hearing changes  Cardiovascular-denies chest pain  Pulmonary- denies shortness of breath  GI- denies constipation or bowel incontinence  - denies bladder incontinence  Skin- denies wounds or rashes  Musculoskeletal- +weakness, +pain  Neurologic- +numbness and tingling  Psychiatric- denies depressive or psychotic features, denies anxiety  Lymphatic-denies swelling  Endocrine- denies hypoglycemic symptoms/DM history  All other pertinent systems negative     Physical Examination:  General: Well developed, well nourished male, NAD  HEENT:NCAT EOMI bilaterally   Pulmonary:Normal respirations    Spinal Examination: CERVICAL  Active ROM is limited at endranges.  Inspection: No deformity of spinal alignment.    Spinal Examination: LUMBAR or THORACIC  Active ROM is limited at endranges  Inspection: No deformity of spinal alignment.      Bilateral Upper and Lower  Extremities:  Pulses are 2+ brisk at patellar bilaterally.  Shoulder/Elbow/Wrist/Hand ROM wnl  Hip/Knee/Ankle ROM wnl  Bilateral Extremities show normal capillary refill.  No signs of cyanosis, rubor, edema, skin changes, or dysvascular changes of appendages.  Nails appear intact.    Neurological Exam:  Cranial Nerves:  II-XII grossly intact    Manual Muscle Testing: (Motor 5=normal)  5/5 strength bilateral lower extremities    No focal atrophy is noted of either lower extremity.    Bilateral Reflexes hypo at bic tric br  Bowser's response is absent bilaterally.    Sensation: tested to light touch  - intact in arms     Gait: limited hip and knee ROM, stiff gait-worse on right with mild forward flex      Entire procedure explained to patient prior to proceeding.  Verbal consent obtained    SNC      Nerve / Sites Rec. Site Onset Lat Peak Lat Amp Segments Distance Velocity     ms ms µV  mm m/s   R Sural - Ankle (Calf)      Calf Ankle 3.1 3.8 7.1 Calf - Ankle 140 46   L Sural - Ankle (Calf)      Calf Ankle 2.9 3.9 10.4 Calf - Ankle 140 48       MNC      Nerve / Sites Muscle Latency Amplitude Duration Rel Amp Segments Distance Lat Diff Velocity     ms mV ms %  mm ms m/s   R Peroneal - EDB      Ankle EDB 3.6 2.1 4.5 100 Ankle - EDB 80        Fib head EDB 10.5 2.0 6.2 93.5 Fib head - Ankle 320 6.9 47      Pop fossa EDB 12.7 1.8 5.8 92.2 Pop fossa - Fib head 90 2.2 41         Pop fossa - Ankle  9.1    L Peroneal - EDB      Ankle EDB 3.8 7.3 4.7 100 Ankle - EDB 80        Fib head EDB 10.3 6.0 6.1 81.9 Fib head - Ankle 300 6.5 46      Pop fossa EDB 12.4 5.8 5.6 97.2 Pop fossa - Fib head 90 2.1 42         Pop fossa - Ankle  8.6    R Tibial - AH      Ankle AH 4.8 4.1 4.3 100 Ankle - AH 80        Pop fossa AH 14.0 3.3 7.2 80.5 Pop fossa - Ankle 420 9.2 46   L Tibial - AH      Ankle AH 4.6 6.4 4.6 100 Ankle - AH 80        Pop fossa AH 15.2 3.9 5.4 61.5 Pop fossa - Ankle 420 10.6 40       EMG        EMG Summary Table      Spontaneous MUAP Recruitment   Muscle IA Fib PSW Fasc Other Dur. Dur Amp Dur Polys Pattern Effort   R. Tibialis anterior N None None None .   N N 1+ sl red Max   R. Gastrocnemius (Medial head) Incr 2+ 2+ None .   N Sl Incr N sl red Max   R. Extensor digitorum brevis N None None None .   N N 1+ Reduced Max   R. Abductor hallucis Incr 2+ 2+ None .   N N 1+ Reduced Max   R. Rectus femoris N None None None .   N N N N Max   R. Biceps femoris (short head) Incr None None None .   N N 1+ Reduced Max   L. Extensor digitorum brevis Incr 2+ 1+ None .   Sl Incr Sl Incr 1+ Reduced Max   L. Abductor hallucis Incr 1+ 1+ None .   N N 1+ Reduced Max                           INTERPRETATION  -Bilateral sural sensory nerve  conduction study showed normal peak latency and amplitude  -Bilateral peroneal motor nerve conduction study showed normal latency, dec amplitude on the right when compared to the left, and normal conduction velocity  -Bilateral tibial motor nerve conduction study showed normal latency, amplitude, and conduction velocity  -Needle EMG examination performed to above mentioned muscles       IMPRESSION  1. ABNORMAL study  2. There is electrodiagnostic evidence of an ACUTE on CHRONIC radiculopathy of the bilateral S1 and left L5 nerve roots and a CHRONIC radiculopathy of the right L5 nerve root    PLAN  1. Follow up with referring provider: Dr. Minerva Dominguez  2. Handouts on Lspine issues, radiculopathy, stretching provided. Rec referral to IPM for further evaluation and treatment (possible ESIs) prior to seeing NSG  3. This study is good for one year. If symptoms worsen or do not improve, please re-consult.    Blanka Govea M.D.  Physical Medicine and Rehab

## 2020-05-22 NOTE — PROGRESS NOTES
CC:  Chief Complaint   Patient presents with    Spinal stenosis     lower back pain- affecting walking & getting worse-EMG- results       History of Present Illness:  Roberto Flores a 59 y.o.yo male presents for follow-up   Here for review of recent EMG studies  He would like a refill on gabapentin and Robaxin for his low back pain  He is a  and he has chronic low back pain in 2018 and MRI of L-spine was done which was suggestive of degenerative disc disease  Foraminal encroachment  He was seen by a neurosurgeon in [place] and advise conservative management  Since last visit he had EMG studies done which was suggestive of acute on chronic radiculopathy   of the bilateral S1 and left L5 nerve roots and a CHRONIC radiculopathy of the right L5 nerve root  Pain radiates down to both lower extremities down the thighs to knees  No loss of sensations, no incontinence  I recommend he sees Pain Management and Neurosurgery for further recommendations    In the past gabapentin and Robaxin have helped him  He otherwise takes ibuprofen as needed for pain    Last visit he was noted to have elevated LFTs and aldolase  Previously noted to have a positive IMELDA 1:640, homogeneous with negative profile  Normal complements, normal APL a panel    In 2018 he said after he was informed of elevated LFTs he was seen by his PCP who repeated his labs and was told normal  In 2020 was noted to have repeat elevated LFTs, mild elevated CK, elevated aldolase, normal Ken marker panel  He did a virtual visit with hepatology and was advised labs and ultrasound  He has not done them yet  He very occasionally drinks alcohol, he does not take any over-the-counter supplements  Not on any statins  He denies any abdominal pain, nausea, vomiting  No family history of autoimmune liver disease or other autoimmune conditions  But he does admit to taking lot of NSAIDs  He stopped taking Tylenol        History  Seen in 2018 Initially    He was  seen back then for chronic neck pain but now the neck pain seems to be stable no radiation of pain or no tingling numbness in the upper extremities  He is now more concerned about bilateral hip pain with radiation down both the thighs, up to knees  He notes early morning stiffness and resting stiffness in both the hips  He feels better moving actually  He takes Tylenol as needed  No loss of sensation  MRI was suggestive of spinal stenosis,   He was advised to follow up with neurosurgeon, he decided to go back to [place] to follow up with his neurosurgeon  Per  him no further surgical intervention was recommended    In the past he has used gabapentin for neck pain and this has helped  He also had a cervical neck surgery in the past done by  neurosurgeon at [place]    positive IMELDA 1 in 640 and mild elevation in CK  He however denies any rash, joint swelling, history of uveitis.  He denies any chest pain or shortness of breath.  Denies any fever chills, weight loss    No family history of any connective tissue disease  Denies any history of hepatitis he is aware of      Last visit he was noted to have elevated LFTs and aldolase he followed up with his PCP back then in 2018 and they normalized per him         He is having a cough on lisinopril and stopped , due to f/u PCP   No rashes, fever, chills, sicca syndrome          Past Medical History:   Diagnosis Date    Hypertension 8/2016       Past Surgical History:   Procedure Laterality Date    CERVICAL DISC SURGERY           Social History     Tobacco Use    Smoking status: Never Smoker   Substance Use Topics    Alcohol use: Yes     Comment: occassionaly    Drug use: No       History reviewed. No pertinent family history.     None for CTD     Review of patient's allergies indicates:  No Known Allergies          Review of Systems:  Constitutional: Denies fever, chills. No recent weight changes.   Fatigue: no  Muscle weakness: no  Headaches: no new  headaches  Eyes: No redness or dryness.  No recent visual changes.  ENT: Denies dry mouth. No oral or nasal ulcers.  Card: No chest pain.  Resp: No cough or sob.   Gastro: No nausea or vomiting.  No heartburn.  Constipation: no  Diarrhea: no  Genito:  No dysuria.  No genital ulcers.  Skin: No rash.  Raynauds:no  Neuro: non focal   Psych: No depression, anxiety  Endo:  no excess thirst.  Heme: no abnormal bleeding or bruising  Clots:none   Miscarriages :     OBJECTIVE:     Vital Signs   Vitals:    05/22/20 0913   BP: (!) 138/91   Pulse: 79     Physical Exam:  General Appearance:  NAD.   Gait: not favoring.  HEENT: PERRL.  Eyes not dry or injected.  No nasal ulcers.  Mouth not dry, no oral lesions.  Lymph: cervical, supraclavicular or axillary nodes: none abnormal   Cardio: no irregularity of S1 or S2.  No gallops or rubs.   Resp: Normal respiratory motion. Clear to auscultation bilaterally.   No abnormal chest conformation.  Abd: Soft, non-tender, nondistended.  No masses.   Skin: Head and neck,  and extremities examined. No rashes.   Neuro: Ox3.   Cranial nerves II-XII grossly intact.   Sensation intact  in both distal LE and upper extremities to light touch.  Musculoskeletal Exam:    No synovitis   Spine :  Tenderness in lower lumbar facet region  Occiput to wall :neg   Modified schobers :neg       Muscle strength:Equal and full in all mm groups of the upper and lower ext.    Laboratory:   Results for orders placed or performed in visit on 03/06/20   Urinalysis   Result Value Ref Range    Specimen UA Urine, Clean Catch     Color, UA Yellow Yellow, Straw, Poonam    Appearance, UA Clear Clear    pH, UA 6.0 5.0 - 8.0    Specific Gravity, UA 1.025 1.005 - 1.030    Protein, UA Negative Negative    Glucose, UA Negative Negative    Ketones, UA Negative Negative    Bilirubin (UA) Negative Negative    Occult Blood UA Negative Negative    Nitrite, UA Negative Negative    Leukocytes, UA Negative Negative     Imaging :L4-L5  asymmetric mild right neural foraminal encroachment and spinal canal narrowing secondary to disc protrusion and facet and ligamentum flavum hypertrophy.  There is also mild spinal canal narrowing at L3-L4 as discussed above.    Notes reviewed  Other procedures:  EMG 2020    IMPRESSION  1. ABNORMAL study  2. There is electrodiagnostic evidence of an ACUTE on CHRONIC radiculopathy of the bilateral S1 and left L5 nerve roots and a CHRONIC radiculopathy of the right L5 nerve root     PLAN  1. Follow up with referring provider: Dr. Minerva Dominguez  2. Handouts on Lspine issues, radiculopathy, stretching provided. Rec referral to IPM for further evaluation and treatment (possible ESIs) prior to seeing NSG  3. This study is good for one year. If symptoms worsen or do not improve, please re-consult.       ASSESSMENT/PLAN:     Lumbar degenerative disc disease  -     methocarbamoL (ROBAXIN) 750 MG Tab; Take 1 tablet (750 mg total) by mouth 2 (two) times daily as needed.  Dispense: 60 tablet; Refill: 1  -     gabapentin (NEURONTIN) 100 MG capsule; Take 1 capsule (100 mg total) by mouth 2 (two) times daily.  Dispense: 60 capsule; Refill: 5  -     Ambulatory referral/consult to Neurosurgery; Future; Expected date: 05/29/2020    Elevated LFTs    IMELDA positive        1:  Lumbar spinal stenosis with radiculopathy:  Per  his neurosurgeon nosurgical management recommended in 2018  Restart gabapentin 100 mg p.o. B.i.d.- he tolerates this dose well he denies any drowsiness on the does  Robaxin twice daily as need   Refer  to pain management    2:  IMELDA positive1:640 homogeneous:  With negative panel  RF/CCP normal  Normal complements, normal a SMITH panel    3:  Mild elevated CK,   Normal Ken marker panel  EMG no evidence of myositis  Could be normal for his race, gender   Not on any statin  Asymptomatic    4:  Elevated liver function tests:  Asymptomatic  With elevated aldolase  Etiology unclear  Rule out autoimmune hepatitis  Advised he  do all his labs as recommended by hepatology today and also schedule an ultrasound and further follow-up with them    3  month return with labs all 4, complements, UA    Risks vs Benefits and potential side effects of medication prescribed today were discussed with patient. Medication literature given to patient up discharge  Went over uptodate information /literature on the meds prescribed today      CC :  Dr. Blanco    Disclaimer: This note was prepared using voice recognition system and is likely to have sound alike errors and is not proof read.  Please call me with any questions.

## 2020-05-22 NOTE — PATIENT INSTRUCTIONS
Exercises to Strengthen Your Lower Back  Strong lower back and abdominal muscles work together to support your spine. The exercises below will help strengthen the lower back. It is important that you begin exercising slowly and increase levels gradually.  Always begin any exercise program with stretching. If you feel pain while doing any of these exercises, stop and talk to your doctor about a more specific exercise program that better suits your condition.   Low back stretch  The point of stretching is to make you more flexible and increase your range of motion. Stretch only as much as you are able. Stretch slowly. Do not push your stretch to the limit. If at any point you feel pain while stretching, this is your (temporary) limit.  · Lie on your back with your knees bent and both feet on the ground.  · Slowly raise your left knee to your chest as you flatten your lower back against the floor. Hold for 5 seconds.  · Relax and repeat the exercise with your right knee.  · Do 10 of these exercises for each leg.  · Repeat hugging both knees to your chest at the same time.  Building lower back strength  Start your exercise routine with 10 to 30 minutes a day, 1 to 3 times a day.  Initial exercises  Lying on your back:  1. Ankle pumps: Move your foot up and down, towards your head, and then away. Repeat 10 times with each foot.  2. Heel slides: Slowly bend your knee, drawing the heel of your foot towards you. Then slide your heel/foot from you, straightening your knee. Do not lift your foot off the floor (this is not a leg lift).  3. Abdominal contraction: Bend your knees and put your hands on your stomach. Tighten your stomach muscles. Hold for 5 seconds, then relax. Repeat 10 times.  4. Straight leg raise: Bend one leg at the knee and keep the other leg straight. Tighten your stomach muscles. Slowly lift your straight leg 6 to 12 inches off the floor and hold for up to 5 seconds. Repeat 10 times on each  side.  Standin. Wall squats: Stand with your back against the wall. Move your feet about 12 inches away from the wall. Tighten your stomach muscles, and slowly bend your knees until they are at about a 45 degree angle. Do not go down too far. Hold about 5 seconds. Then slowly return to your starting position. Repeat 10 times.  2. Heel raises: Stand facing the wall. Slowly raise the heels of your feet up and down, while keeping your toes on the floor. If you have trouble balancing, you can touch the wall with your hands. Repeat 10 times.  More advanced exercises  When you feel comfortable enough, try these exercises.  1. Kneeling lumbar extension: Begin on your hands and knees. At the same time, raise and straighten your right arm and left leg until they are parallel to the ground. Hold for 2 seconds and come back slowly to a starting position. Repeat with left arm and right leg, alternating 10 times.  2. Prone lumbar extension: Lie face down, arms extended overhead, palms on the floor. At the same time, raise your right arm and left leg as high as comfortably possible. Hold for 10 seconds and slowly return to start. Repeat with left arm and right leg, alternating 10 times. Gradually build up to 20 times. (Advanced: Repeat this exercise raising both arms and both legs a few inches off the floor at the same time. Hold for 5 seconds and release.)  3. Pelvic tilt: Lie on the floor on your back with your knees bent at 90 degrees. Your feet should be flat on the floor. Inhale, exhale, then slowly contract your abdominal muscles bringing your navel toward your spine. Let your pelvis rock back until your lower back is flat on the floor. Hold for 10 seconds while breathing smoothly.  4. Abdominal crunch: Perform a pelvic tilt (above) flattening your lower back against the floor. Holding the tension in your abdominal muscles, take another breath and raise your shoulder blades off the ground (this is not a full sit-up).  Keep your head in line with your body (dont bend your neck forward). Hold for 2 seconds, then slowly lower.  Date Last Reviewed: 6/1/2016  © 2241-1988 [employer]. [address] [place] [address]. All rights reserved. This information is not intended as a substitute for professional medical care. Always follow your healthcare professional's instructions.        Possible Causes of Low Back or Leg Pain    The symptoms in your back or leg may be due to pressure on a nerve. This pressure may be caused by a damaged disk or by abnormal bone growth. Either way, you may feel pain, burning, tingling, or numbness. If you have pressure on a nerve that connects to the sciatic nerve, pain may shoot down your leg.    Pressure from the disk  Constant wear and tear can weaken a disk over time and cause back pain. The disk can then be damaged by a sudden movement or injury. If its soft center begins to bulge, the disk may press on a nerve. Or the outside of the disk may tear, and the soft center may squeeze through and pinch a nerve.    Pressure from bone  As a disk wears out, the vertebrae right above and below the disk begin to touch. This can put pressure on a nerve. Often, abnormal bone (called bone spurs) grows where the vertebrae rub against each other. This can cause the foramen or the spinal canal to narrow (called stenosis) and press against a nerve.  Date Last Reviewed: 10/4/2015  © 9709-2769 [employer]. [address] [place] [address]. All rights reserved. This information is not intended as a substitute for professional medical care. Always follow your healthcare professional's instructions.        Relieving Back Pain  Back pain is a common problem. You can strain back muscles by lifting too much weight or just by moving the wrong way. Back strain can be uncomfortable, even painful. And it can take weeks or months to improve. To help yourself feel better and prevent  future back strains, try these tips.  Important Note: Do not give aspirin to children or teens without first discussing it with your healthcare provider.      ? Ice    Ice reduces muscle pain and swelling. It helps most during the first 24 to 48 hours after an injury.  · Wrap an ice pack or a bag of frozen peas in a thin towel. (Never place ice directly on your skin.)  · Place the ice where your back hurts the most.  · Dont ice for more than 20 minutes at a time.  · You can use ice several times a day.  ? Medicines  Over-the-counter pain relievers can include acetaminophen and anti-inflammatory medicines, which includes aspirin or ibuprofen. They can help ease discomfort. Some also reduce swelling.  · Tell your healthcare provider about any medicines you are already taking.  · Take medicines only as directed.  ? Heat  After the first 48 hours, heat can relax sore muscles and improve blood flow.  · Try a warm bath or shower. Or use a heating pad set on low. To prevent a burn, keep a cloth between you and the heating pad.  · Dont use a heating pad for more than 15 minutes at a time. Never sleep on a heating pad.  Date Last Reviewed: 9/1/2015  © 8484-0865 [employer] [address] [place] [address], [place] [address]. All rights reserved. This information is not intended as a substitute for professional medical care. Always follow your healthcare professional's instructions.        Back Safety: Poor Posture Hurts  An unhealthy spine often starts with bad habits. Poor movement patterns and posture problems are common causes of back pain. Disk, bone, nerve, and soft tissue problems can all be affected by poor posture. They can lead to pain, stiffness, and other symptoms.    Poor posture backfires  Poor posture can cause pain. Too much slouching puts increased pressure on the disks. An excessive lumbar curve can overload and inflame the vertebrae. As a result, the back muscles may tighten or spasm to splint and  protect the spine. This adds to the pain you feel.    Proper posture: The key to safe movement  Your spine bears your weight throughout the day. This is true whether youre sleeping, standing, or bending. Certain positions strain your spine more than others. But by maintaining proper posture in all positions, you can reduce the stress on your spine.       To improve your standing posture, follow these steps:  · Breathe deeply.  · Relax your shoulders, hips, and ankles. · Think of the ears, shoulders, hips, and ankles as a series of dots. Now, adjust your body to connect the dots in a straight line.  · Tuck your buttocks in just a bit if you need to.      Date Last Reviewed: 10/28/2015  © 2221-3374 [employer]. [address] [place] [address], [place] [address]. All rights reserved. This information is not intended as a substitute for professional medical care. Always follow your healthcare professional's instructions.        Relieving Tension in Your Back  Being relaxed helps keep your mind healthy and your back ready to move. Take short breaks often. Walk around. Stretch. Switch tasks. Also give the following a try.  Make time to relax. Start by setting aside 5 minutes daily.   Deep breathing    Deep breathing is a simple way to reduce stress. You can do it almost any time you need to relax.  · Inhale slowly through your nose. Let your lungs and stomach expand.  · Hold your breath for 2 to 3 seconds.  · Exhale slowly through your mouth until your lungs feel empty. Repeat 3 to 4 times.  Relieve tension  Muscle tension can create tender spots called trigger points. The tips below may help relieve muscle tension.  · Press the trigger point if you can reach it. If not, lie on a soft tennis ball, or ask a friend to press the spot. Use steady pressure for 10 to 15 seconds. Breathe deeply. Repeat a few times.  · Massage trigger points with ice for 2 to 5 minutes. Press lightly at first. Slowly increase  firmness.  Date Last Reviewed: 10/18/2015  © 7310-3069 [employer]. [address] [place] [address], [place] [address]. All rights reserved. This information is not intended as a substitute for professional medical care. Always follow your healthcare professional's instructions.        Understanding Lumbar Radiculopathy    Lumbar radiculopathy is irritation or inflammation of a nerve root in the low back. It causes symptoms that spread out from the back down one or both legs. To understand this condition, it helps to understand the parts of the spine:  · Vertebrae. These are bones that stack to form the spine. The lumbar spine contains the 5 bottom vertebrae.  · Disks. These are soft pads of tissue between the vertebrae. They act as shock absorbers for the spine.  · Spinal canal. This is a tunnel formed within the stacked vertebrae. In the lumbar spine, nerves run through this canal.  · Nerves. These branch off and leave the spinal canal, traveling out to parts of the body. As they leave the spinal canal, nerves pass through openings between the vertebrae. The nerve root is the part of the nerve that is closest to the spinal canal.  · Sciatic nerve. This is a large nerve formed from several nerve roots in the low back. This nerve extends down the back of the leg to the foot.  With lumbar radiculopathy, nerve roots in the low back become irritated. This leads to pain and symptoms. The sciatic nerve is commonly involved, so the condition is often called sciatica.  What causes lumbar radiculopathy?  Aging, injury, poor posture, extra body weight, and other issues can lead to problems in the low back. These problems may then irritate nerve roots. They include:  · Damage to a disk in the lumbar spine. The damaged disk may then press on nearby nerve roots.  · Degeneration from wear and tear, and aging. This can lead to narrowing (stenosis) of the openings between the vertebrae. The narrowed openings press on nerve  roots as they leave the spinal canal.  · Unstable spine. This is when a vertebra slips forward. It can then press on a nerve root.  Other, less common things can put pressure on nerves in the low back. These include diabetes, infection, or a tumor.  Symptoms of lumbar radiculopathy  These include:  · Pain in the low back  · Pain, numbness, tingling, or weakness that travels into the buttocks, hip, groin, or leg  · Muscle spasms  Treatment for lumbar radiculopathy  In most cases, your healthcare provider will first try treatments that help relieve symptoms. These may include:  · Prescription and over-the-counter pain medicines. These help relieve pain, swelling, and irritation.  · Limits on positions and activities that increase pain. But lying in bed or avoiding all movement is only recommended for a short period of time.  · Physical therapy, including exercises and stretches. This helps decrease pain and increase movement and function.  · Steroid shots into the lower back. This may help relieve symptoms for a time.  · Weight-loss program. If you are overweight, losing extra pounds may help relieve symptoms.  In some cases, you may need surgery to fix the underlying problem. This depends on the cause, the symptoms, and how long the pain has lasted.  Possible complications  Over time, an irritated and inflamed nerve may become damaged. This may lead to long-lasting (permanent) numbness or weakness in your legs and feet. If symptoms change suddenly or get worse, be sure to let your healthcare provider know.  When to call your healthcare provider  Call your healthcare provider right away if you have any of these:  · New pain or pain that gets worse  · New or increasing weakness, tingling, or numbness in your leg or foot  · Problems controlling your bladder or bowel   Date Last Reviewed: 3/10/2016  © 5874-0589 [employer]. [address] [place] [address], [place] [address]. All rights reserved. This information is  not intended as a substitute for professional medical care. Always follow your healthcare professional's instructions.

## 2020-05-25 LAB
HBV SURFACE AB SER-ACNC: NEGATIVE M[IU]/ML
HBV SURFACE AG SERPL QL IA: NEGATIVE
HCV AB SERPL QL IA: NEGATIVE
HEPATITIS A ANTIBODY, IGG: NEGATIVE
MITOCHONDRIA AB TITR SER IF: NORMAL {TITER}
SMOOTH MUSCLE AB TITR SER IF: NORMAL {TITER}

## 2020-07-21 ENCOUNTER — TELEPHONE (OUTPATIENT)
Dept: GASTROENTEROLOGY | Facility: CLINIC | Age: 60
End: 2020-07-21

## 2020-07-21 NOTE — TELEPHONE ENCOUNTER
----- Message from Maye Christopher sent at 7/21/2020  3:23 PM CDT -----  Regarding: reschedule appts  Please call back Spouse in reference to rescheduling husbands appt and ultrasound. The call back number is [phone].  Thanks

## 2020-07-27 ENCOUNTER — TELEPHONE (OUTPATIENT)
Dept: NEUROSURGERY | Facility: CLINIC | Age: 60
End: 2020-07-27

## 2020-07-27 NOTE — TELEPHONE ENCOUNTER
Spoke with pt wife about rescheduling [facility]  appointment on 08/28/20 pt wife confirmed new appointment on 08/25/20 at 9:30am.

## 2020-07-28 ENCOUNTER — TELEPHONE (OUTPATIENT)
Dept: RADIOLOGY | Facility: HOSPITAL | Age: 60
End: 2020-07-28

## 2020-07-29 ENCOUNTER — LAB VISIT (OUTPATIENT)
Dept: LAB | Facility: HOSPITAL | Age: 60
End: 2020-07-29
Attending: NURSE PRACTITIONER
Payer: COMMERCIAL

## 2020-07-29 ENCOUNTER — OFFICE VISIT (OUTPATIENT)
Dept: GASTROENTEROLOGY | Facility: CLINIC | Age: 60
End: 2020-07-29
Payer: COMMERCIAL

## 2020-07-29 ENCOUNTER — HOSPITAL ENCOUNTER (OUTPATIENT)
Dept: RADIOLOGY | Facility: HOSPITAL | Age: 60
Discharge: HOME | End: 2020-07-29
Attending: INTERNAL MEDICINE
Payer: COMMERCIAL

## 2020-07-29 VITALS
BODY MASS INDEX: 32.66 KG/M2 | SYSTOLIC BLOOD PRESSURE: 142 MMHG | WEIGHT: 184.31 LBS | HEART RATE: 74 BPM | DIASTOLIC BLOOD PRESSURE: 90 MMHG | HEIGHT: 63 IN

## 2020-07-29 DIAGNOSIS — R79.89 ELEVATED FERRITIN: ICD-10-CM

## 2020-07-29 DIAGNOSIS — K76.0 FATTY LIVER: ICD-10-CM

## 2020-07-29 DIAGNOSIS — R74.8 ELEVATED LIVER ENZYMES: ICD-10-CM

## 2020-07-29 DIAGNOSIS — R74.8 ELEVATED LIVER ENZYMES: Primary | ICD-10-CM

## 2020-07-29 DIAGNOSIS — R79.89 ABNORMAL LFTS: ICD-10-CM

## 2020-07-29 PROCEDURE — 3008F BODY MASS INDEX DOCD: CPT | Mod: CPTII,S$GLB,, | Performed by: NURSE PRACTITIONER

## 2020-07-29 PROCEDURE — 76705 US ABDOMEN LIMITED: ICD-10-PCS | Mod: 26,,, | Performed by: RADIOLOGY

## 2020-07-29 PROCEDURE — 99999 PR PBB SHADOW E&M-EST. PATIENT-LVL III: ICD-10-PCS | Mod: PBBFAC,,, | Performed by: NURSE PRACTITIONER

## 2020-07-29 PROCEDURE — 99999 PR PBB SHADOW E&M-EST. PATIENT-LVL III: CPT | Mod: PBBFAC,,, | Performed by: NURSE PRACTITIONER

## 2020-07-29 PROCEDURE — 76705 ECHO EXAM OF ABDOMEN: CPT | Mod: TC

## 2020-07-29 PROCEDURE — 76705 ECHO EXAM OF ABDOMEN: CPT | Mod: 26,,, | Performed by: RADIOLOGY

## 2020-07-29 PROCEDURE — 81256 HFE GENE: CPT

## 2020-07-29 PROCEDURE — 3008F PR BODY MASS INDEX (BMI) DOCUMENTED: ICD-10-PCS | Mod: CPTII,S$GLB,, | Performed by: NURSE PRACTITIONER

## 2020-07-29 PROCEDURE — 36415 COLL VENOUS BLD VENIPUNCTURE: CPT

## 2020-07-29 PROCEDURE — 99204 PR OFFICE/OUTPT VISIT, NEW, LEVL IV, 45-59 MIN: ICD-10-PCS | Mod: S$GLB,,, | Performed by: NURSE PRACTITIONER

## 2020-07-29 PROCEDURE — 99204 OFFICE O/P NEW MOD 45 MIN: CPT | Mod: S$GLB,,, | Performed by: NURSE PRACTITIONER

## 2020-07-29 NOTE — PROGRESS NOTES
Clinic Consult:  [facility] Consultation Note    Reason for Consult:  The primary encounter diagnosis was Elevated liver enzymes. Diagnoses of Elevated ferritin and Fatty liver were also pertinent to this visit.    PCP: Omid Lopez   No address on file    HPI:  This is a 59 y.o. male here for evaluation of the above  Pt referred by Dr. Dominguez for further evaluation of elevated LFTs.   Pt denies any known liver disease and is unclear as to why this referral was made.   He states that he has been feeling well without any GI complaints.   Denies any IVDU. No ETOH.    Per chart review, in 2018, LFTs were significantly elevated for unclear reason.  Recent labs show continued slight elevation of ALT.   US today showed hepatic steatosis without any other significant findings.  Gallstone present but pt is asymptomatic.   Previously ordered labs are unremarkable with the exception of slightly elevated ferritin.   Denies any abdominal pain.  No nausea or vomiting.  No change in bowel pattern.  No melena or hematochezia. No weight loss.  No upper GI bleeding.  No ascites or BLE.  No overt confusion.      Review of Systems   Constitutional: Negative for chills, fever, malaise/fatigue and weight loss.   Respiratory: Negative for cough.    Cardiovascular: Negative for chest pain.   Gastrointestinal:        Per HPI   Musculoskeletal: Negative for myalgias.   Skin: Negative for itching and rash.   Neurological: Negative for headaches.   Psychiatric/Behavioral: The patient is not nervous/anxious.        Medical History:   Past Medical History:   Diagnosis Date    Hypertension        Surgical History:  Past Surgical History:   Procedure Laterality Date    CERVICAL DISC SURGERY         Family History:   No family history on file.    Social History:   Social History     Tobacco Use    Smoking status: Never Smoker   Substance Use Topics    Alcohol use: Yes     Comment: occassionaly    Drug use: No       Allergies:  "Reviewed    Home Medications:   Current Outpatient Medications on File Prior to Visit   Medication Sig Dispense Refill    amLODIPine (NORVASC) 5 MG tablet Take 5 mg by mouth once daily.      gabapentin (NEURONTIN) 100 MG capsule Take 1 capsule (100 mg total) by mouth 2 (two) times daily. 60 capsule 5    methocarbamoL (ROBAXIN) 750 MG Tab Take 1 tablet (750 mg total) by mouth 2 (two) times daily as needed. 60 tablet 1    hydrOXYzine HCl (ATARAX) 10 MG Tab Take 25 mg by mouth 3 (three) times daily as needed.      lisinopril-hydrochlorothiazide (PRINZIDE,ZESTORETIC) 10-12.5 mg per tablet Take 1 tablet by mouth once daily.      multivitamin (ONE DAILY MULTIVITAMIN) per tablet Take 1 tablet by mouth once daily.      naproxen (NAPROSYN) 500 MG tablet Take 1 tablet (500 mg total) by mouth 2 (two) times daily. With food (Patient not taking: Reported on 3/6/2020) 60 tablet 0     No current facility-administered medications on file prior to visit.        Physical Exam:  Vital Signs:  BP (!) 142/90   Pulse 74   Ht 5' 2.5" (1.588 m)   Wt 83.6 kg (184 lb 4.9 oz)   BMI 33.17 kg/m²   Body mass index is 33.17 kg/m².  Physical Exam  Vitals signs reviewed.   Constitutional:       Appearance: He is well-developed.   HENT:      Head: Normocephalic.   Eyes:      General: No scleral icterus.  Neck:      Musculoskeletal: Normal range of motion.   Cardiovascular:      Rate and Rhythm: Normal rate.   Pulmonary:      Effort: Pulmonary effort is normal.   Abdominal:      General: There is no distension.      Palpations: Abdomen is soft.   Musculoskeletal: Normal range of motion.   Skin:     General: Skin is dry.   Neurological:      Mental Status: He is alert and oriented to person, place, and time.         Labs: Pertinent labs reviewed.    Assessment:  1. Elevated liver enzymes    2. Elevated ferritin    3. Fatty liver         Recommendations:  - Educated patient on spectrum of fatty liver disease and potential for cirrhosis if " SHETH present  - Advised weight loss (10%), strict glycemic control and lipid control (statins are ok if needed, prescribing doctor will need to monitor LFTs per routine)  - given the elevated ferritin, recommend testing for HH.   - Recommended Fibroscan for staging of liver disease.  - Pt is hesitant to have any further testing done. Explained the recommendations and why these are recommended.  Pt has agreed to testing.     Follow up to be determined by results of above.        Thank you so much for allowing me to participate in the care of Roberto Sherman    ULYSSES Vivas

## 2020-08-07 LAB
GENETICIST REVIEW: NORMAL
HFE GENE MUT ANL BLD/T: NORMAL
HFE RELEASED BY: NORMAL
HFE RESULT SUMMARY: NEGATIVE
REF LAB TEST METHOD: NORMAL
SPECIMEN SOURCE: NORMAL
SPECIMEN,  HEMOCHROMATOSIS: NORMAL

## 2020-08-20 ENCOUNTER — TELEPHONE (OUTPATIENT)
Dept: RHEUMATOLOGY | Facility: CLINIC | Age: 60
End: 2020-08-20

## 2020-08-20 ENCOUNTER — TELEPHONE (OUTPATIENT)
Dept: NEUROSURGERY | Facility: CLINIC | Age: 60
End: 2020-08-20

## 2020-08-20 ENCOUNTER — TELEPHONE (OUTPATIENT)
Dept: GASTROENTEROLOGY | Facility: CLINIC | Age: 60
End: 2020-08-20

## 2020-08-20 NOTE — TELEPHONE ENCOUNTER
Returned call to pt's wife states she needs to r/s 8/24/20 appts. Stated she would call back after getting the other appts rescheduled to put Dr. Dominguez's on the same day,

## 2020-08-20 NOTE — TELEPHONE ENCOUNTER
----- Message from La Nena Agrawal sent at 8/20/2020  3:07 PM CDT -----  Contact: wife Doris/[phone]  Type:  Sooner Apoointment Request    Caller is requesting a sooner appointment.  Caller declined first available appointment listed below.  Caller will not accept being placed on the waitlist and is requesting a message be sent to doctor.  Name of Caller Doris  When is the first available appointment?10-11-20  Symptoms:  Would the patient rather a call back or a response via [facility]? Call back  Best Call Back Number:[phone]  Additional Information:  Patient would like to consult to change appointment dates and labs for the same day  Mayo/EDIS

## 2020-08-20 NOTE — TELEPHONE ENCOUNTER
----- Message from Anjelica Coley sent at 8/20/2020  3:56 PM CDT -----  Regarding: Reschedule  Contact: pt wife  Stated he will like to reschedule appointment for 08/24, he can be reached at [phone] Thanks

## 2020-08-20 NOTE — TELEPHONE ENCOUNTER
Pt wife is trying to get all appts scheduled on the same day with providers. I informed the pt wife that once she gets his current appt rescheduled to send us a  Message with the new appt date and we will get him scheduled. I informed her that I could not get him rescheduled because she currently had another providers office trying to reschedule. Pt wife verbalized understanding of the call    ----- Message from La Nena Agrawal sent at 8/20/2020  3:14 PM CDT -----  Contact: Manolo George [phone]  Type:  Sooner Apoointment Request    Caller is requesting a sooner appointment.  Caller declined first available appointment listed below.  Caller will not accept being placed on the waitlist and is requesting a message be sent to doctor.  Name of Caller:Doris  When is the first available appointment?10-10-20  Symptoms:  Would the patient rather a call back or a response via [facility]? Call back  Best Call Back Number:[phone]  Additional Information: Patient has current appointment and would like to change to next month     Thanks/EDIS

## 2020-08-21 ENCOUNTER — TELEPHONE (OUTPATIENT)
Dept: ENDOSCOPY | Facility: HOSPITAL | Age: 60
End: 2020-08-21

## 2020-08-21 NOTE — TELEPHONE ENCOUNTER
----- Message from La Nena Agrawal sent at 8/20/2020  3:12 PM CDT -----  Contact: wife/[phone] Doris  Type:  Sooner Apoointment Request    Caller is requesting a sooner appointment.  Caller declined first available appointment listed below.  Caller will not accept being placed on the waitlist and is requesting a message be sent to doctor.  Name of Caller:Doris  When is the first available appointment?10-20-20  Symptoms:  Would the patient rather a call back or a response via [facility]? Call back   Best Call Back Number:[phone]  Additional Information: Patient would like to cancel current appointment and change.    Thanks/EDIS

## 2020-08-21 NOTE — TELEPHONE ENCOUNTER
Spoke with patient's wife regarding scheduling an endoscopy procedure.  Informed her of patient not having an order to have an endoscopy procedure.  Stated she rescheduled the appointment to a later date.

## 2020-10-02 ENCOUNTER — TELEPHONE (OUTPATIENT)
Dept: ENDOSCOPY | Facility: HOSPITAL | Age: 60
End: 2020-10-02

## 2020-10-02 NOTE — TELEPHONE ENCOUNTER
Pt wife called to r/s his procedures at [facility] on 10-. Provided number to [facility] for assistance. [email or web address]

## 2020-11-12 ENCOUNTER — TELEPHONE (OUTPATIENT)
Dept: NEUROSURGERY | Facility: CLINIC | Age: 60
End: 2020-11-12

## 2020-11-12 NOTE — TELEPHONE ENCOUNTER
Spoke with pts wife in regards to rescheduling his appt pts wife stated that she will give us a call back with a good day and time due to the driving from [place], left call back # with pts wife.

## 2020-11-16 ENCOUNTER — TELEPHONE (OUTPATIENT)
Dept: NEUROSURGERY | Facility: CLINIC | Age: 60
End: 2020-11-16

## 2024-07-24 ENCOUNTER — PATIENT MESSAGE (OUTPATIENT)
Dept: HEPATOLOGY | Facility: CLINIC | Age: 64
End: 2024-07-24
Payer: COMMERCIAL